# Patient Record
Sex: FEMALE | Race: WHITE | NOT HISPANIC OR LATINO | Employment: UNEMPLOYED | URBAN - METROPOLITAN AREA
[De-identification: names, ages, dates, MRNs, and addresses within clinical notes are randomized per-mention and may not be internally consistent; named-entity substitution may affect disease eponyms.]

---

## 2017-02-24 ENCOUNTER — HOSPITAL ENCOUNTER (OUTPATIENT)
Facility: MEDICAL CENTER | Age: 22
End: 2017-02-24
Attending: EMERGENCY MEDICINE
Payer: MEDICAID

## 2017-02-24 ENCOUNTER — OFFICE VISIT (OUTPATIENT)
Dept: URGENT CARE | Facility: CLINIC | Age: 22
End: 2017-02-24

## 2017-02-24 VITALS
HEART RATE: 90 BPM | OXYGEN SATURATION: 98 % | WEIGHT: 190 LBS | DIASTOLIC BLOOD PRESSURE: 82 MMHG | TEMPERATURE: 98.2 F | SYSTOLIC BLOOD PRESSURE: 120 MMHG | RESPIRATION RATE: 16 BRPM

## 2017-02-24 DIAGNOSIS — J02.9 PHARYNGITIS, UNSPECIFIED ETIOLOGY: ICD-10-CM

## 2017-02-24 LAB
INT CON NEG: NORMAL
INT CON POS: NORMAL
S PYO AG THROAT QL: NEGATIVE

## 2017-02-24 PROCEDURE — 87070 CULTURE OTHR SPECIMN AEROBIC: CPT

## 2017-02-24 PROCEDURE — 99000 SPECIMEN HANDLING OFFICE-LAB: CPT | Performed by: EMERGENCY MEDICINE

## 2017-02-24 PROCEDURE — 99203 OFFICE O/P NEW LOW 30 MIN: CPT | Performed by: EMERGENCY MEDICINE

## 2017-02-24 PROCEDURE — 87077 CULTURE AEROBIC IDENTIFY: CPT

## 2017-02-24 PROCEDURE — 87880 STREP A ASSAY W/OPTIC: CPT | Performed by: EMERGENCY MEDICINE

## 2017-02-24 RX ORDER — CITALOPRAM HYDROBROMIDE 10 MG/1
10 TABLET ORAL DAILY
COMMUNITY
End: 2018-01-07

## 2017-02-24 ASSESSMENT — ENCOUNTER SYMPTOMS
COUGH: 0
SENSORY CHANGE: 0
SWOLLEN GLANDS: 1
ABDOMINAL PAIN: 0
HEADACHES: 1
HOARSE VOICE: 0
HEMOPTYSIS: 0
TROUBLE SWALLOWING: 1
SHORTNESS OF BREATH: 0
NECK PAIN: 0
STRIDOR: 0
SPEECH CHANGE: 0
EYE REDNESS: 0
DIARRHEA: 0
BACK PAIN: 0
VOMITING: 0
SORE THROAT: 1
CHILLS: 0
NERVOUS/ANXIOUS: 0
FEVER: 1
PALPITATIONS: 0
EYE DISCHARGE: 0

## 2017-02-24 NOTE — MR AVS SNAPSHOT
Rosalba Leavitt   2017 6:15 PM   Office Visit   MRN: [id number]    Department:  [facility]   Dept Phone:  [phone]    Description:  Female : 1995   Provider:  MICHELLE Licona M.D.           Reason for Visit     Pharyngitis sore throat, headache, body aches chills x 4 days      Allergies as of 2017     No Known Allergies      You were diagnosed with     Pharyngitis, unspecified etiology   [[id number]]         Vital Signs     Blood Pressure Pulse Temperature Respirations Weight Oxygen Saturation    120/82 mmHg 90 36.8 °C (98.2 °F) 16 86.183 kg (190 lb) 98%    Last Menstrual Period Breastfeeding? 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   2011 - Present      Health Maintenance        Date Due Completion Dates    IMM HEP B VACCINE (1 of 3 - Primary Series) 1996 ---    IMM HEP A VACCINE (1 of 2 - Standard Series) 1997 ---    IMM HPV VACCINE (1 of 3 - Female 3 Dose Series) 2007 ---    IMM VARICELLA (CHICKENPOX) VACCINE (2 of 2 - 2 Dose Adolescent Series) 2011    IMM MENINGOCOCCAL VACCINE (MCV4) (2 of 2) 2012    IMM DTaP/Tdap/Td Vaccine (1 - Tdap) 2015 ---    IMM INFLUENZA (1) 2016 ---            Results     POCT Rapid Strep A      Component    Rapid Strep Screen    NEGATIVE    Internal Control Positive    Valid    Internal Control Negative    Valid                        Current Immunizations     IPV 2011    Meningococcal Conjugate Vaccine MCV4 (Menactra) 2011    Tuberculin Skin Test 2014  3:40 PM, 2014  2:25 PM    Varicella Vaccine Live 2011      Below and/or attached are the medications your provider expects you to take. Review all of  your home medications and newly ordered medications with your provider and/or pharmacist. Follow medication instructions as directed by your provider and/or pharmacist. Please keep your medication list with you and share with your provider. Update the information when medications are discontinued, doses are changed, or new medications (including over-the-counter products) are added; and carry medication information at all times in the event of emergency situations     Allergies:  No Known Allergies          Medications  Valid as of: February 24, 2017 -  6:53 PM    Generic Name Brand Name Tablet Size Instructions for use    Albuterol Sulfate (Aero Soln) VENTOLIN  (90 BASE) MCG/ACT Inhale 2 Puffs by mouth every 6 hours as needed for Shortness of Breath. Please include metered dose inhaler chamber/spacer device and illustrate useage        Citalopram Hydrobromide (Tab) CELEXA 10 MG Take 10 mg by mouth every day.        Hydrocod Polst-Chlorphen Polst (Liquid CR) TUSSIONEX 10-8 MG/5ML Take 5 mL by mouth every 12 hours as needed for Cough or Rhinitis. No driving or operation of machinery after taking this medication. Do not exceed 10 mL in a 24-hour period.        Lidocaine HCl (Solution) XYLOCAINE 2 % Take 5 mL by mouth 4 times a day.        .                 Medicines prescribed today were sent to:     [facility] [address] [place] [address], [place] - [address]    [address] [place] [address]    Phone: [phone] Fax: [phone]    Open 24 Hours?: No      Medication refill instructions:       If your prescription bottle indicates you have medication refills left, it is not necessary to call your provider’s office. Please contact your pharmacy and they will refill your medication.    If your prescription bottle indicates you do not have any refills left, you may request refills at any time through one of the following ways: The online [employer] system (except Urgent Care), by calling your provider’s  office, or by asking your pharmacy to contact your provider’s office with a refill request. Medication refills are processed only during regular business hours and may not be available until the next business day. Your provider may request additional information or to have a follow-up visit with you prior to refilling your medication.   *Please Note: Medication refills are assigned a new Rx number when refilled electronically. Your pharmacy may indicate that no refills were authorized even though a new prescription for the same medication is available at the pharmacy. Please request the medicine by name with the pharmacy before contacting your provider for a refill.        Your To Do List     Future Labs/Procedures Complete By Expires    CULTURE THROAT  As directed 2/24/2018         Potential Access Code: [email or web address]  Expires: 3/26/2017  6:53 PM    Your email address is not on file at [email or web address].  Email Addresses are required for you to sign up for Potential, please contact [phone] to verify your personal information and to provide your email address prior to attempting to register for Potential.    Rosalba Leavitt  [address] [place] [address]    Potential  A secure, online tool to manage your health information     [email or web address]’s Potential® is a secure, online tool that connects you to your personalized health information from the privacy of your home -- day or night - making it very easy for you to manage your healthcare. Once the activation process is completed, you can even access your medical information using the Potential mag, which is available for free in the Apple Mag store or Google Play store.     To learn more about Potential, visit [email or web address]    There are two levels of access available (as shown below):   My Chart Features  [facility] Doctor [facility]  Specialists [facility]  Urgent  Care Non-[facility] Primary Care Doctor   Email your healthcare team securely and privately  24/7 X X X    Manage appointments: schedule your next appointment; view details of past/upcoming appointments X      Request prescription refills. X      View recent personal medical records, including lab and immunizations X X X X   View health record, including health history, allergies, medications X X X X   Read reports about your outpatient visits, procedures, consult and ER notes X X X X   See your discharge summary, which is a recap of your hospital and/or ER visit that includes your diagnosis, lab results, and care plan X X  X     How to register for [employer]:  Once your e-mail address has been verified, follow the following steps to sign up for [employer].     1. Go to  [email or web address]  2. Click on the Sign Up Now box, which takes you to the New Member Sign Up page. You will need to provide the following information:  a. Enter your [employer] Access Code exactly as it appears at the top of this page. (You will not need to use this code after you’ve completed the sign-up process. If you do not sign up before the expiration date, you must request a new code.)   b. Enter your date of birth.   c. Enter your home email address.   d. Click Submit, and follow the next screen’s instructions.  3. Create a Sequella ID. This will be your Sequella login ID and cannot be changed, so think of one that is secure and easy to remember.  4. Create a Sequella password. You can change your password at any time.  5. Enter your Password Reset Question and Answer. This can be used at a later time if you forget your password.   6. Enter your e-mail address. This allows you to receive e-mail notifications when new information is available in Sequella.  7. Click Sign Up. You can now view your health information.    For assistance activating your Sequella account, call [phone]

## 2017-02-25 NOTE — PROGRESS NOTES
Subjective:      Rosalba Leavitt is a 21 y.o. female who presents with Pharyngitis            Pharyngitis   This is a new problem. Episode onset: for the past 4 days. The problem has been gradually worsening. The pain is worse on the right side. The maximum temperature recorded prior to her arrival was 100.4 - 100.9 F. The pain is at a severity of 7/10. Associated symptoms include headaches, swollen glands and trouble swallowing. Pertinent negatives include no abdominal pain, congestion, coughing, diarrhea, drooling, ear discharge, ear pain, hoarse voice, plugged ear sensation, neck pain, shortness of breath, stridor or vomiting.     No Known Allergies   Social History     Social History   • Marital Status: Single     Spouse Name: N/A   • Number of Children: N/A   • Years of Education: N/A     Occupational History   • Not on file.     Social History Main Topics   • Smoking status: Never Smoker    • Smokeless tobacco: Not on file   • Alcohol Use: No   • Drug Use: No   • Sexual Activity: No     Other Topics Concern   • Not on file     Social History Narrative   History reviewed. No pertinent past medical history.   Current Outpatient Prescriptions on File Prior to Visit   Medication Sig Dispense Refill   • hydrocod polst-chlorphen polst (TUSSIONEX PENNKINETIC ER) 10-8 MG/5ML LQCR Take 5 mL by mouth every 12 hours as needed for Cough or Rhinitis. No driving or operation of machinery after taking this medication. Do not exceed 10 mL in a 24-hour period. 75 mL 0   • VENTOLIN  (90 BASE) MCG/ACT AERS Inhale 2 Puffs by mouth every 6 hours as needed for Shortness of Breath. Please include metered dose inhaler chamber/spacer device and illustrate useage 1 Inhaler 0     No current facility-administered medications on file prior to visit.     Family History   Problem Relation Age of Onset   • Adopted: Yes     Review of Systems   Constitutional: Positive for fever. Negative for chills.   HENT: Positive for sore throat  and trouble swallowing. Negative for congestion, drooling, ear discharge, ear pain, hoarse voice and tinnitus.    Eyes: Negative for discharge and redness.   Respiratory: Negative for cough, hemoptysis, shortness of breath and stridor.    Cardiovascular: Negative for chest pain and palpitations.   Gastrointestinal: Negative for vomiting, abdominal pain and diarrhea.   Genitourinary: Negative.    Musculoskeletal: Negative for back pain and neck pain.   Skin: Negative for rash.   Neurological: Positive for headaches. Negative for sensory change and speech change.   Psychiatric/Behavioral: The patient is not nervous/anxious.           Objective:     /82 mmHg  Pulse 90  Temp(Src) 36.8 °C (98.2 °F)  Resp 16  Wt 86.183 kg (190 lb)  SpO2 98%  LMP 01/28/2017  Breastfeeding? No     Physical Exam   Constitutional: She appears well-developed and well-nourished. She appears distressed.   HENT:   Head: Normocephalic and atraumatic.   Right Ear: External ear normal.   Left Ear: External ear normal.   Mouth/Throat: No oropharyngeal exudate.   Pharynx with purulent tonsils,   Eyes: Conjunctivae are normal. Right eye exhibits no discharge. Left eye exhibits no discharge. No scleral icterus.   Neck: No tracheal deviation present. No thyromegaly present.   Cardiovascular: Normal rate and regular rhythm.    No murmur heard.  Pulmonary/Chest: Effort normal and breath sounds normal. No stridor. No respiratory distress. She has no wheezes.   Abdominal: She exhibits no distension. There is no tenderness.   Musculoskeletal: Normal range of motion. She exhibits no edema or tenderness.   Neurological: She is alert. Coordination normal.   Skin: She is not diaphoretic. No erythema.   Psychiatric: She has a normal mood and affect. Her behavior is normal.   Vitals reviewed.            Rapid strep elinor  Throst culture pending.  Assessment/Plan:     DX Acute Purulent Tonsillitis    I am recommending the patient initiate/ continue  hydration efforts including the use of a vaporizer/humidifier/ netti pot. I also recommend the pt, initiate Mucinex (if older than 4) Sudafed or Dayquil if not hypertensive. In addition the patient will initiate the prescribed prescription medication/s lidocaine viscous,: I will call if positive If the patient's condition exacerbates with worsening dysphagia, shortness of breath, uncontrolled fever, headache or chest pressure he/she will return immediately to the urgent care or go to  the emergency department for further evaluation.  Pt is going into the weekend and doesn't need a work note..  MICHELLE Licona  .

## 2017-02-27 LAB
BACTERIA SPEC RESP CULT: ABNORMAL
BACTERIA SPEC RESP CULT: ABNORMAL
SIGNIFICANT IND 70042: ABNORMAL
SOURCE SOURCE: ABNORMAL

## 2017-02-28 ENCOUNTER — TELEPHONE (OUTPATIENT)
Dept: URGENT CARE | Facility: CLINIC | Age: 22
End: 2017-02-28

## 2017-03-15 ENCOUNTER — HOSPITAL ENCOUNTER (EMERGENCY)
Facility: MEDICAL CENTER | Age: 22
End: 2017-03-15
Attending: EMERGENCY MEDICINE
Payer: MEDICAID

## 2017-03-15 ENCOUNTER — APPOINTMENT (OUTPATIENT)
Dept: RADIOLOGY | Facility: MEDICAL CENTER | Age: 22
End: 2017-03-15
Attending: EMERGENCY MEDICINE
Payer: MEDICAID

## 2017-03-15 VITALS
OXYGEN SATURATION: 93 % | SYSTOLIC BLOOD PRESSURE: 138 MMHG | HEART RATE: 87 BPM | RESPIRATION RATE: 16 BRPM | HEIGHT: 64 IN | DIASTOLIC BLOOD PRESSURE: 86 MMHG | BODY MASS INDEX: 36.4 KG/M2 | WEIGHT: 213.19 LBS | TEMPERATURE: 98.8 F

## 2017-03-15 DIAGNOSIS — R10.13 EPIGASTRIC PAIN: ICD-10-CM

## 2017-03-15 DIAGNOSIS — N30.00 ACUTE CYSTITIS WITHOUT HEMATURIA: ICD-10-CM

## 2017-03-15 LAB
ALBUMIN SERPL BCP-MCNC: 4.1 G/DL (ref 3.2–4.9)
ALBUMIN/GLOB SERPL: 1.3 G/DL
ALP SERPL-CCNC: 90 U/L (ref 30–99)
ALT SERPL-CCNC: 19 U/L (ref 2–50)
ANION GAP SERPL CALC-SCNC: 9 MMOL/L (ref 0–11.9)
APPEARANCE UR: CLEAR
AST SERPL-CCNC: 21 U/L (ref 12–45)
BACTERIA #/AREA URNS HPF: ABNORMAL /HPF
BASOPHILS # BLD AUTO: 0.5 % (ref 0–1.8)
BASOPHILS # BLD: 0.05 K/UL (ref 0–0.12)
BILIRUB SERPL-MCNC: 0.5 MG/DL (ref 0.1–1.5)
BILIRUB UR QL STRIP.AUTO: NEGATIVE
BUN SERPL-MCNC: <5 MG/DL (ref 8–22)
CALCIUM SERPL-MCNC: 9.3 MG/DL (ref 8.4–10.2)
CHLORIDE SERPL-SCNC: 102 MMOL/L (ref 96–112)
CO2 SERPL-SCNC: 25 MMOL/L (ref 20–33)
COLOR UR: YELLOW
CREAT SERPL-MCNC: 0.58 MG/DL (ref 0.5–1.4)
CULTURE IF INDICATED INDCX: YES UA CULTURE
EOSINOPHIL # BLD AUTO: 0.11 K/UL (ref 0–0.51)
EOSINOPHIL NFR BLD: 1 % (ref 0–6.9)
EPI CELLS #/AREA URNS HPF: ABNORMAL /HPF
ERYTHROCYTE [DISTWIDTH] IN BLOOD BY AUTOMATED COUNT: 37 FL (ref 35.9–50)
GFR SERPL CREATININE-BSD FRML MDRD: >60 ML/MIN/1.73 M 2
GLOBULIN SER CALC-MCNC: 3.1 G/DL (ref 1.9–3.5)
GLUCOSE SERPL-MCNC: 85 MG/DL (ref 65–99)
GLUCOSE UR STRIP.AUTO-MCNC: NEGATIVE MG/DL
HCG SERPL QL: NEGATIVE
HCT VFR BLD AUTO: 43.8 % (ref 37–47)
HGB BLD-MCNC: 15.6 G/DL (ref 12–16)
IMM GRANULOCYTES # BLD AUTO: 0.03 K/UL (ref 0–0.11)
IMM GRANULOCYTES NFR BLD AUTO: 0.3 % (ref 0–0.9)
KETONES UR STRIP.AUTO-MCNC: 15 MG/DL
LEUKOCYTE ESTERASE UR QL STRIP.AUTO: ABNORMAL
LIPASE SERPL-CCNC: 15 U/L (ref 7–58)
LYMPHOCYTES # BLD AUTO: 3.52 K/UL (ref 1–4.8)
LYMPHOCYTES NFR BLD: 31.8 % (ref 22–41)
MCH RBC QN AUTO: 30.6 PG (ref 27–33)
MCHC RBC AUTO-ENTMCNC: 35.6 G/DL (ref 33.6–35)
MCV RBC AUTO: 86.1 FL (ref 81.4–97.8)
MICRO URNS: ABNORMAL
MONOCYTES # BLD AUTO: 0.66 K/UL (ref 0–0.85)
MONOCYTES NFR BLD AUTO: 6 % (ref 0–13.4)
NEUTROPHILS # BLD AUTO: 6.69 K/UL (ref 2–7.15)
NEUTROPHILS NFR BLD: 60.4 % (ref 44–72)
NITRITE UR QL STRIP.AUTO: NEGATIVE
NRBC # BLD AUTO: 0 K/UL
NRBC BLD AUTO-RTO: 0 /100 WBC
PH UR STRIP.AUTO: 7 [PH]
PLATELET # BLD AUTO: 322 K/UL (ref 164–446)
PMV BLD AUTO: 11.3 FL (ref 9–12.9)
POTASSIUM SERPL-SCNC: 3.8 MMOL/L (ref 3.6–5.5)
PROT SERPL-MCNC: 7.2 G/DL (ref 6–8.2)
PROT UR QL STRIP: NEGATIVE MG/DL
RBC # BLD AUTO: 5.09 M/UL (ref 4.2–5.4)
RBC # URNS HPF: ABNORMAL /HPF
RBC UR QL AUTO: NEGATIVE
SODIUM SERPL-SCNC: 136 MMOL/L (ref 135–145)
SP GR UR STRIP.AUTO: <=1.005
WBC # BLD AUTO: 11.1 K/UL (ref 4.8–10.8)
WBC #/AREA URNS HPF: ABNORMAL /HPF

## 2017-03-15 PROCEDURE — 700111 HCHG RX REV CODE 636 W/ 250 OVERRIDE (IP): Performed by: EMERGENCY MEDICINE

## 2017-03-15 PROCEDURE — 80053 COMPREHEN METABOLIC PANEL: CPT

## 2017-03-15 PROCEDURE — 76705 ECHO EXAM OF ABDOMEN: CPT

## 2017-03-15 PROCEDURE — 83690 ASSAY OF LIPASE: CPT

## 2017-03-15 PROCEDURE — 99284 EMERGENCY DEPT VISIT MOD MDM: CPT

## 2017-03-15 PROCEDURE — 700105 HCHG RX REV CODE 258: Performed by: EMERGENCY MEDICINE

## 2017-03-15 PROCEDURE — 87086 URINE CULTURE/COLONY COUNT: CPT

## 2017-03-15 PROCEDURE — 36415 COLL VENOUS BLD VENIPUNCTURE: CPT

## 2017-03-15 PROCEDURE — 81001 URINALYSIS AUTO W/SCOPE: CPT

## 2017-03-15 PROCEDURE — 85025 COMPLETE CBC W/AUTO DIFF WBC: CPT

## 2017-03-15 PROCEDURE — 96375 TX/PRO/DX INJ NEW DRUG ADDON: CPT

## 2017-03-15 PROCEDURE — 94760 N-INVAS EAR/PLS OXIMETRY 1: CPT

## 2017-03-15 PROCEDURE — 96374 THER/PROPH/DIAG INJ IV PUSH: CPT

## 2017-03-15 PROCEDURE — 84703 CHORIONIC GONADOTROPIN ASSAY: CPT

## 2017-03-15 RX ORDER — NITROFURANTOIN 25; 75 MG/1; MG/1
100 CAPSULE ORAL 2 TIMES DAILY
Qty: 14 CAP | Refills: 0 | Status: SHIPPED | OUTPATIENT
Start: 2017-03-15 | End: 2017-03-22

## 2017-03-15 RX ORDER — HYDROCODONE BITARTRATE AND ACETAMINOPHEN 5; 325 MG/1; MG/1
1-2 TABLET ORAL EVERY 6 HOURS PRN
Qty: 15 TAB | Refills: 0 | Status: SHIPPED | OUTPATIENT
Start: 2017-03-15 | End: 2018-01-07

## 2017-03-15 RX ORDER — SODIUM CHLORIDE 9 MG/ML
1000 INJECTION, SOLUTION INTRAVENOUS ONCE
Status: COMPLETED | OUTPATIENT
Start: 2017-03-15 | End: 2017-03-15

## 2017-03-15 RX ORDER — MECOBALAMIN 5000 MCG
15 TABLET,DISINTEGRATING ORAL DAILY
Qty: 30 CAP | Refills: 0 | Status: SHIPPED | OUTPATIENT
Start: 2017-03-15 | End: 2018-01-07

## 2017-03-15 RX ORDER — MORPHINE SULFATE 4 MG/ML
4 INJECTION, SOLUTION INTRAMUSCULAR; INTRAVENOUS ONCE
Status: COMPLETED | OUTPATIENT
Start: 2017-03-15 | End: 2017-03-15

## 2017-03-15 RX ORDER — ONDANSETRON 4 MG/1
4 TABLET, ORALLY DISINTEGRATING ORAL EVERY 8 HOURS PRN
Qty: 10 TAB | Refills: 0 | Status: SHIPPED | OUTPATIENT
Start: 2017-03-15 | End: 2018-01-07

## 2017-03-15 RX ORDER — ONDANSETRON 2 MG/ML
4 INJECTION INTRAMUSCULAR; INTRAVENOUS ONCE
Status: COMPLETED | OUTPATIENT
Start: 2017-03-15 | End: 2017-03-15

## 2017-03-15 RX ADMIN — MORPHINE SULFATE 4 MG: 4 INJECTION INTRAVENOUS at 19:22

## 2017-03-15 RX ADMIN — ONDANSETRON 4 MG: 2 INJECTION, SOLUTION INTRAMUSCULAR; INTRAVENOUS at 19:08

## 2017-03-15 RX ADMIN — SODIUM CHLORIDE 1000 ML: 9 INJECTION, SOLUTION INTRAVENOUS at 19:07

## 2017-03-15 ASSESSMENT — PAIN SCALES - GENERAL: PAINLEVEL_OUTOF10: 0

## 2017-03-15 NOTE — ED AVS SNAPSHOT
[employer] Access Code: [email or web address]  Expires: 3/26/2017  7:53 PM    Your email address is not on file at [email or web address].  Email Addresses are required for you to sign up for [employer], please contact [phone] to verify your personal information and to provide your email address prior to attempting to register for [employer].    Rosalba Oneil Dagmar  [address] [place] [address]    [employer]  A secure, online tool to manage your health information     [facility]’s [employer]® is a secure, online tool that connects you to your personalized health information from the privacy of your home -- day or night - making it very easy for you to manage your healthcare. Once the activation process is completed, you can even access your medical information using the [employer] mag, which is available for free in the Apple Mag store or Google Play store.     To learn more about [employer], visit [email or web address]    There are two levels of access available (as shown below):   My Chart Features  [facility] Primary Care Doctor [facility]  Specialists [facility]  Urgent  Care Non-[facility] Primary Care Doctor   Email your healthcare team securely and privately 24/7 X X X    Manage appointments: schedule your next appointment; view details of past/upcoming appointments X      Request prescription refills. X      View recent personal medical records, including lab and immunizations X X X X   View health record, including health history, allergies, medications X X X X   Read reports about your outpatient visits, procedures, consult and ER notes X X X X   See your discharge summary, which is a recap of your hospital and/or ER visit that includes your diagnosis, lab results, and care plan X X  X     How to register for [employer]:  Once your e-mail address has been verified, follow the following steps to sign up for [employer].     1. Go to  [email or web address]  2. Click on the Sign Up Now box, which takes you to the New Member Sign Up page.  You will need to provide the following information:  a. Enter your [employer] Access Code exactly as it appears at the top of this page. (You will not need to use this code after you’ve completed the sign-up process. If you do not sign up before the expiration date, you must request a new code.)   b. Enter your date of birth.   c. Enter your home email address.   d. Click Submit, and follow the next screen’s instructions.  3. Create a [employer] ID. This will be your [employer] login ID and cannot be changed, so think of one that is secure and easy to remember.  4. Create a [employer] password. You can change your password at any time.  5. Enter your Password Reset Question and Answer. This can be used at a later time if you forget your password.   6. Enter your e-mail address. This allows you to receive e-mail notifications when new information is available in [employer].  7. Click Sign Up. You can now view your health information.    For assistance activating your [employer] account, call [phone]

## 2017-03-15 NOTE — ED AVS SNAPSHOT
3/15/2017          Rosalba Leavitt  [address] [place] [address]    Dear Rosalba:    [facility] wants to ensure your discharge home is safe and you or your loved ones have had all your questions answered regarding your care after you leave the hospital.    You may receive a telephone call within two days of your discharge.  This call is to make certain you understand your discharge instructions as well as ensure we provided you with the best care possible during your stay with us.     The call will only last approximately 3-5 minutes and will be done by a nurse.    Once again, we want to ensure your discharge home is safe and that you have a clear understanding of any next steps in your care.  If you have any questions or concerns, please do not hesitate to contact us, we are here for you.  Thank you for choosing [facility] for your healthcare needs.    Sincerely,    Roverto Dejesus    [facility]

## 2017-03-15 NOTE — ED NOTES
Saw PMD was worked up, originally she had abd pain now the pain is in her back with painful urination.

## 2017-03-15 NOTE — ED AVS SNAPSHOT
Home Care Instructions                                                                                                                Rosalba Leavitt   MRN: [id number]    Department:  [facility], Emergency Dept   Date of Visit:  3/15/2017            [facility], Emergency Dept    [address]    [place] [address]    Phone:  [phone]      You were seen by     Luis Valdes M.D.      Your Diagnosis Was     Epigastric pain     R10.13       These are the medications you received during your hospitalization from 03/15/2017 1521 to 03/15/2017 2119     Date/Time Order Dose Route Action    03/15/2017 1907 NS infusion 1,000 mL 1,000 mL Intravenous New Bag    03/15/2017 1922 morphine (pf) 4 mg/ml injection 4 mg 4 mg Intravenous Given    03/15/2017 1908 ondansetron (ZOFRAN) syringe/vial injection 4 mg 4 mg Intravenous Given      Follow-up Information     1. Follow up with [facility]. Call in 1 day.    Specialty:  Physician Assistant    Contact information    [address] [place] [address] [phone]        Medication Information     Review all of your home medications and newly ordered medications with your primary doctor and/or pharmacist as soon as possible. Follow medication instructions as directed by your doctor and/or pharmacist.     Please keep your complete medication list with you and share with your physician. Update the information when medications are discontinued, doses are changed, or new medications (including over-the-counter products) are added; and carry medication information at all times in the event of emergency situations.               Medication List      START taking these medications        Instructions    Morning Afternoon Evening Bedtime    hydrocodone-acetaminophen 5-325 MG Tabs per tablet   Commonly known as:  NORCO        Take 1-2 Tabs by mouth every 6 hours as needed.   Dose:  1-2 Tab                        lansoprazole 15 MG Cpdr   Commonly known as:  PREVACID 24HR        Take 1 Cap by mouth every day.   Dose:  15 mg                        ondansetron 4 MG Tbdp   Commonly known as:  ZOFRAN ODT        Take 1 Tab by mouth every 8 hours as needed for Nausea/Vomiting.   Dose:  4 mg                          ASK your doctor about these medications        Instructions    Morning Afternoon Evening Bedtime    CELEXA 10 MG tablet   Generic drug:  citalopram        Take 10 mg by mouth every day.   Dose:  10 mg                        hydrocod polst-chlorphen polst 10-8 MG/5ML Lqcr   Commonly known as:  TUSSIONEX PENNKINETIC ER        Take 5 mL by mouth every 12 hours as needed for Cough or Rhinitis. No driving or operation of machinery after taking this medication. Do not exceed 10 mL in a 24-hour period.   Dose:  5 mL                        lidocaine viscous 2% 2 % Soln   Commonly known as:  XYLOCAINE        Doctor's comments:  5 cc in 1/4 cup of H2O swish and spit.   Take 5 mL by mouth 4 times a day.   Dose:  5 mL                        VENTOLIN  (90 BASE) MCG/ACT Aers inhalation aerosol   Generic drug:  albuterol        Inhale 2 Puffs by mouth every 6 hours as needed for Shortness of Breath. Please include metered dose inhaler chamber/spacer device and illustrate useage   Dose:  2 Puff                             Where to Get Your Medications      You can get these medications from any pharmacy     Bring a paper prescription for each of these medications    - hydrocodone-acetaminophen 5-325 MG Tabs per tablet  - lansoprazole 15 MG Cpdr  - ondansetron 4 MG Tbdp            Procedures and tests performed during your visit     CBC WITH DIFFERENTIAL    COMP METABOLIC PANEL    ESTIMATED GFR    HCG QUAL SERUM    LIPASE    URINALYSIS,CULTURE IF INDICATED    URINE CULTURE(NEW)    URINE MICROSCOPIC (W/UA)    US-GALLBLADDER        Discharge Instructions       Gastritis, Adult  Gastritis is soreness and swelling (inflammation) of  the lining of the stomach. Gastritis can develop as a sudden onset (acute) or long-term (chronic) condition. If gastritis is not treated, it can lead to stomach bleeding and ulcers.  CAUSES   Gastritis occurs when the stomach lining is weak or damaged. Digestive juices from the stomach then inflame the weakened stomach lining. The stomach lining may be weak or damaged due to viral or bacterial infections. One common bacterial infection is the Helicobacter pylori infection. Gastritis can also result from excessive alcohol consumption, taking certain medicines, or having too much acid in the stomach.   SYMPTOMS   In some cases, there are no symptoms. When symptoms are present, they may include:  · Pain or a burning sensation in the upper abdomen.  · Nausea.  · Vomiting.  · An uncomfortable feeling of fullness after eating.  DIAGNOSIS   Your caregiver may suspect you have gastritis based on your symptoms and a physical exam. To determine the cause of your gastritis, your caregiver may perform the following:  · Blood or stool tests to check for the H pylori bacterium.  · Gastroscopy. A thin, flexible tube (endoscope) is passed down the esophagus and into the stomach. The endoscope has a light and camera on the end. Your caregiver uses the endoscope to view the inside of the stomach.  · Taking a tissue sample (biopsy) from the stomach to examine under a microscope.  TREATMENT   Depending on the cause of your gastritis, medicines may be prescribed. If you have a bacterial infection, such as an H pylori infection, antibiotics may be given. If your gastritis is caused by too much acid in the stomach, H2 blockers or antacids may be given. Your caregiver may recommend that you stop taking aspirin, ibuprofen, or other nonsteroidal anti-inflammatory drugs (NSAIDs).  HOME CARE INSTRUCTIONS  · Only take over-the-counter or prescription medicines as directed by your caregiver.  · If you were given antibiotic medicines, take them  as directed. Finish them even if you start to feel better.  · Drink enough fluids to keep your urine clear or pale yellow.  · Avoid foods and drinks that make your symptoms worse, such as:  1. Caffeine or alcoholic drinks.  2. Chocolate.  3. Peppermint or mint flavorings.  4. Garlic and onions.  5. Spicy foods.  6. Citrus fruits, such as oranges, fran, or limes.  7. Tomato-based foods such as sauce, chili, salsa, and pizza.  8. Fried and fatty foods.  · Eat small, frequent meals instead of large meals.  SEEK IMMEDIATE MEDICAL CARE IF:   · You have black or dark red stools.  · You vomit blood or material that looks like coffee grounds.  · You are unable to keep fluids down.  · Your abdominal pain gets worse.  · You have a fever.  · You do not feel better after 1 week.  · You have any other questions or concerns.  MAKE SURE YOU:  · Understand these instructions.  · Will watch your condition.  · Will get help right away if you are not doing well or get worse.     This information is not intended to replace advice given to you by your health care provider. Make sure you discuss any questions you have with your health care provider.     Document Released: 12/12/2002 Document Revised: 06/18/2013 Document Reviewed: 01/30/2013  [employer] Patient Education ©2016 [employer]        Abdominal Pain (Nonspecific)  Your exam might not show the exact reason you have abdominal pain. Since there are many different causes of abdominal pain, another checkup and more tests may be needed. It is very important to follow up for lasting (persistent) or worsening symptoms. A possible cause of abdominal pain in any person who still has his or her appendix is acute appendicitis. Appendicitis is often hard to diagnose. Normal blood tests, urine tests, ultrasound, and CT scans do not completely rule out early appendicitis or other causes of abdominal pain. Sometimes, only the changes that happen over time will allow appendicitis  and other causes of abdominal pain to be determined. Other potential problems that may require surgery may also take time to become more apparent. Because of this, it is important that you follow all of the instructions below.  HOME CARE INSTRUCTIONS   · Rest as much as possible.   · Do not eat solid food until your pain is gone.   · While adults or children have pain: A diet of water, weak decaffeinated tea, broth or bouillon, gelatin, oral rehydration solutions (ORS), frozen ice pops, or ice chips may be helpful.   · When pain is gone in adults or children: Start a light diet (dry toast, crackers, applesauce, or white rice). Increase the diet slowly as long as it does not bother you. Eat no dairy products (including cheese and eggs) and no spicy, fatty, fried, or high-fiber foods.   · Use no alcohol, caffeine, or cigarettes.   · Take your regular medicines unless your caregiver told you not to.   · Take any prescribed medicine as directed.   · Only take over-the-counter or prescription medicines for pain, discomfort, or fever as directed by your caregiver. Do not give aspirin to children.   If your caregiver has given you a follow-up appointment, it is very important to keep that appointment. Not keeping the appointment could result in a permanent injury and/or lasting (chronic) pain and/or disability. If there is any problem keeping the appointment, you must call to reschedule.   SEEK IMMEDIATE MEDICAL CARE IF:   · Your pain is not gone in 24 hours.   · Your pain becomes worse, changes location, or feels different.   · You or your child has an oral temperature above 102° F (38.9° C), not controlled by medicine.   · Your baby is older than 3 months with a rectal temperature of 102° F (38.9° C) or higher.   · Your baby is 3 months old or younger with a rectal temperature of 100.4° F (38° C) or higher.   · You have shaking chills.   · You keep throwing up (vomiting) or cannot drink liquids.   · There is blood in  your vomit or you see blood in your bowel movements.   · Your bowel movements become dark or black.   · You have frequent bowel movements.   · Your bowel movements stop (become blocked) or you cannot pass gas.   · You have bloody, frequent, or painful urination.   · You have yellow discoloration in the skin or whites of the eyes.   · Your stomach becomes bloated or bigger.   · You have dizziness or fainting.   · You have chest or back pain.   MAKE SURE YOU:   · Understand these instructions.   · Will watch your condition.   · Will get help right away if you are not doing well or get worse.   Document Released: 12/18/2006 Document Revised: 03/11/2013 Document Reviewed: 11/15/2010  [employer]® Patient Information ©2013 [employer].            Patient Information     Patient Information    Following emergency treatment: all patient requiring follow-up care must return either to a private physician or a clinic if your condition worsens before you are able to obtain further medical attention, please return to the emergency room.     Billing Information    At [facility], we work to make the billing process streamlined for our patients.  Our Representatives are here to answer any questions you may have regarding your hospital bill.  If you have insurance coverage and have supplied your insurance information to us, we will submit a claim to your insurer on your behalf.  Should you have any questions regarding your bill, we can be reached online or by phone as follows:  Online: You are able pay your bills online or live chat with our representatives about any billing questions you may have. We are here to help Monday - Friday from 8:00am to 7:30pm and 9:00am - 12:00pm on Saturdays.  Please visit [email or web address]  for more information.   Phone:  [phone] or [phone]    Please note that your emergency physician, surgeon, pathologist, radiologist, anesthesiologist, and other  specialists are not employed by [facility] and will therefore bill separately for their services.  Please contact them directly for any questions concerning their bills at the numbers below:     Emergency Physician Services:  [phone]  [facility]:  [phone]  [facility]:  [phone]  [facility]:  [phone]    1. Your final bill may vary from the amount quoted upon discharge if all procedures are not complete at that time, or if your doctor has additional procedures of which we are not aware. You will receive an additional bill if you return to the Emergency Department at [facility] for suture removal regardless of the facility of which the sutures were placed.     2. Please arrange for settlement of this account at the emergency registration.    3. All self-pay accounts are due in full at the time of treatment.  If you are unable to meet this obligation then payment is expected within 4-5 days.     4. If you have had radiology studies (CT, X-ray, Ultrasound, MRI), you have received a preliminary result during your emergency department visit. Please contact the radiology department [phone] to receive a copy of your final result. Please discuss the Final result with your primary physician or with the follow up physician provided.     Crisis Hotline:  [place] Crisis Hotline:  0-697-PMCBLLI or 1-811.375.5292  [place] Crisis Hotline:    1-608.259.1192 or 482-908-5432         ED Discharge Follow Up Questions    1. In order to provide you with very good care, we would like to follow up with a phone call in the next few days.  May we have your permission to contact you?     YES /  NO    2. What is the best phone number to call you? (       )_____-__________    3. What is the best time to call you?      Morning  /  Afternoon  /  Evening                   Patient Signature:  ____________________________________________________________    Date:   ____________________________________________________________

## 2017-03-16 NOTE — ED PROVIDER NOTES
"ED Provider Note    CHIEF COMPLAINT  Chief Complaint   Patient presents with   • Abdominal Pain   • Low Back Pain       HPI  Rosalba Clarice Leavitt is a 21 y.o. female who presents with complaint of upper abdominal pain, nausea, not feeling well for the past 3 weeks.  The patient denies any vomiting, but has had intermittent episodes of watery diarrhea. She denies any blood in her stools. She says that when she eats she has some increased diarrhea. She has had no fever, chills, sore throat, cough or congestion, any difficulty breathing. She has been feeling slightly lightheaded and dizzy at times. The patient notes her last menstrual period was about 2 weeks ago on time and normal. The patient recently saw her primary care provider about a week ago and had some blood work and a breath test to check for H. pylori which was negative.  The patient says that she has also been having some pain with urination for the past 3 days.  She is also some lower back pain for the past 2 days.    REVIEW OF SYSTEMS  See HPI for further details. All other systems are negative.     PAST MEDICAL HISTORY  No past medical history on file.    FAMILY HISTORY  Family History   Problem Relation Age of Onset   • Adopted: Yes       SOCIAL HISTORY  Social History     Social History   • Marital Status: Single     Spouse Name: N/A   • Number of Children: N/A   • Years of Education: N/A     Social History Main Topics   • Smoking status: Never Smoker    • Smokeless tobacco: Not on file   • Alcohol Use: No   • Drug Use: No   • Sexual Activity: No     Other Topics Concern   • Not on file     Social History Narrative       SURGICAL HISTORY  No past surgical history on file.    CURRENT MEDICATIONS  Home Medications     **Home medications have not yet been reviewed for this encounter**          ALLERGIES  No Known Allergies    PHYSICAL EXAM  VITAL SIGNS: /86 mmHg  Pulse 87  Temp(Src) 37.1 °C (98.8 °F)  Resp 16  Ht 1.626 m (5' 4\")  Wt 96.7 kg " (213 lb 3 oz)  BMI 36.58 kg/m2  SpO2 93%  LMP 01/28/2017  Constitutional: Awake, alert, in no acute distress, Non-toxic appearance.   HENT: Atraumatic. Bilateral external ears normal, mucous membranes moist, throat nonerythematous without exudates, nose is normal.  Eyes: PERRL, EOMI, conjunctiva moist, noninjected.  Neck: Nontender, Normal range of motion, No nuchal rigidity, No stridor.   Lymphatic: No lymphadenopathy noted.   Cardiovascular: Regular rate and rhythm, no murmurs, rubs, gallops.  Thorax & Lungs:  Good breath sounds bilaterally, no wheezes, rales, or retractions.  No chest tenderness.  Abdomen: Bowel sounds normal, Soft, nondistended, there is tenderness in the epigastrium and upper periumbilical area, no tenderness in the right upper quadrant, no Jamison sign, no tenderness to lower abdomen, no rebound, guarding, masses.  Back: No CVA or spinal tenderness, there is mild tenderness to the lower paralumbar spinous areas bilaterally.  Extremities: Intact distal pulses, No edema, No tenderness.   Skin: Warm, Dry, No rashes.   Musculoskeletal: No joint swelling or tenderness.  Neurologic: Alert & oriented x 3, sensory and motor function normal. No focal deficits.   Psychiatric: Affect normal, Judgment normal, Mood normal.         RADIOLOGY/PROCEDURES  US-GALLBLADDER   Final Result      Unremarkable gallbladder ultrasound.               COURSE & MEDICAL DECISION MAKING  Pertinent Labs & Imaging studies reviewed. (See chart for details)  The patient presents with the above complaints. IV is placed, she was given a bolus of normal saline, morphine, and Zofran.  CBC shows a white count 11,100, normal differential, chemistry profile normal, lipase normal, hCG negative, urine shows 15 ketones, small leukocyte esterase, 2-5 WBC, and few bacteria. Urine was sent for culture. On recheck, the patient was feeling much improved. On reexamination her abdomen is very benign. Findings were discussed with the patient  and her mother. She will be placed on a course of Prevacid, Norco, and Zofran. She will also be started on Macrobid for a possible urinary tract infection. Patient is to follow-up with her PCP tomorrow, return to the ER for any fever, shaking chills, vomiting, worsening pain, or any other problems.    FINAL IMPRESSION  1. Acute gastritis  2. urinary tract infection  3.         Electronically signed by: Luis Valdes, 3/15/2017 9:30 PM

## 2017-03-16 NOTE — ED NOTES
Dc instructions and prescriptions given. Pt to f/u with pcp, return for worsening s/s. Aware of not being able to drive due to meds recvd in er

## 2017-03-16 NOTE — DISCHARGE INSTRUCTIONS
Gastritis, Adult  Gastritis is soreness and swelling (inflammation) of the lining of the stomach. Gastritis can develop as a sudden onset (acute) or long-term (chronic) condition. If gastritis is not treated, it can lead to stomach bleeding and ulcers.  CAUSES   Gastritis occurs when the stomach lining is weak or damaged. Digestive juices from the stomach then inflame the weakened stomach lining. The stomach lining may be weak or damaged due to viral or bacterial infections. One common bacterial infection is the Helicobacter pylori infection. Gastritis can also result from excessive alcohol consumption, taking certain medicines, or having too much acid in the stomach.   SYMPTOMS   In some cases, there are no symptoms. When symptoms are present, they may include:  · Pain or a burning sensation in the upper abdomen.  · Nausea.  · Vomiting.  · An uncomfortable feeling of fullness after eating.  DIAGNOSIS   Your caregiver may suspect you have gastritis based on your symptoms and a physical exam. To determine the cause of your gastritis, your caregiver may perform the following:  · Blood or stool tests to check for the H pylori bacterium.  · Gastroscopy. A thin, flexible tube (endoscope) is passed down the esophagus and into the stomach. The endoscope has a light and camera on the end. Your caregiver uses the endoscope to view the inside of the stomach.  · Taking a tissue sample (biopsy) from the stomach to examine under a microscope.  TREATMENT   Depending on the cause of your gastritis, medicines may be prescribed. If you have a bacterial infection, such as an H pylori infection, antibiotics may be given. If your gastritis is caused by too much acid in the stomach, H2 blockers or antacids may be given. Your caregiver may recommend that you stop taking aspirin, ibuprofen, or other nonsteroidal anti-inflammatory drugs (NSAIDs).  HOME CARE INSTRUCTIONS  · Only take over-the-counter or prescription medicines as directed by  your caregiver.  · If you were given antibiotic medicines, take them as directed. Finish them even if you start to feel better.  · Drink enough fluids to keep your urine clear or pale yellow.  · Avoid foods and drinks that make your symptoms worse, such as:  1. Caffeine or alcoholic drinks.  2. Chocolate.  3. Peppermint or mint flavorings.  4. Garlic and onions.  5. Spicy foods.  6. Citrus fruits, such as oranges, fran, or limes.  7. Tomato-based foods such as sauce, chili, salsa, and pizza.  8. Fried and fatty foods.  · Eat small, frequent meals instead of large meals.  SEEK IMMEDIATE MEDICAL CARE IF:   · You have black or dark red stools.  · You vomit blood or material that looks like coffee grounds.  · You are unable to keep fluids down.  · Your abdominal pain gets worse.  · You have a fever.  · You do not feel better after 1 week.  · You have any other questions or concerns.  MAKE SURE YOU:  · Understand these instructions.  · Will watch your condition.  · Will get help right away if you are not doing well or get worse.     This information is not intended to replace advice given to you by your health care provider. Make sure you discuss any questions you have with your health care provider.     Document Released: 12/12/2002 Document Revised: 06/18/2013 Document Reviewed: 01/30/2013  [employer] Interactive Patient Education ©2016 [employer]        Abdominal Pain (Nonspecific)  Your exam might not show the exact reason you have abdominal pain. Since there are many different causes of abdominal pain, another checkup and more tests may be needed. It is very important to follow up for lasting (persistent) or worsening symptoms. A possible cause of abdominal pain in any person who still has his or her appendix is acute appendicitis. Appendicitis is often hard to diagnose. Normal blood tests, urine tests, ultrasound, and CT scans do not completely rule out early appendicitis or other causes of abdominal pain.  Sometimes, only the changes that happen over time will allow appendicitis and other causes of abdominal pain to be determined. Other potential problems that may require surgery may also take time to become more apparent. Because of this, it is important that you follow all of the instructions below.  HOME CARE INSTRUCTIONS   · Rest as much as possible.   · Do not eat solid food until your pain is gone.   · While adults or children have pain: A diet of water, weak decaffeinated tea, broth or bouillon, gelatin, oral rehydration solutions (ORS), frozen ice pops, or ice chips may be helpful.   · When pain is gone in adults or children: Start a light diet (dry toast, crackers, applesauce, or white rice). Increase the diet slowly as long as it does not bother you. Eat no dairy products (including cheese and eggs) and no spicy, fatty, fried, or high-fiber foods.   · Use no alcohol, caffeine, or cigarettes.   · Take your regular medicines unless your caregiver told you not to.   · Take any prescribed medicine as directed.   · Only take over-the-counter or prescription medicines for pain, discomfort, or fever as directed by your caregiver. Do not give aspirin to children.   If your caregiver has given you a follow-up appointment, it is very important to keep that appointment. Not keeping the appointment could result in a permanent injury and/or lasting (chronic) pain and/or disability. If there is any problem keeping the appointment, you must call to reschedule.   SEEK IMMEDIATE MEDICAL CARE IF:   · Your pain is not gone in 24 hours.   · Your pain becomes worse, changes location, or feels different.   · You or your child has an oral temperature above 102° F (38.9° C), not controlled by medicine.   · Your baby is older than 3 months with a rectal temperature of 102° F (38.9° C) or higher.   · Your baby is 3 months old or younger with a rectal temperature of 100.4° F (38° C) or higher.   · You have shaking chills.   · You keep  throwing up (vomiting) or cannot drink liquids.   · There is blood in your vomit or you see blood in your bowel movements.   · Your bowel movements become dark or black.   · You have frequent bowel movements.   · Your bowel movements stop (become blocked) or you cannot pass gas.   · You have bloody, frequent, or painful urination.   · You have yellow discoloration in the skin or whites of the eyes.   · Your stomach becomes bloated or bigger.   · You have dizziness or fainting.   · You have chest or back pain.   MAKE SURE YOU:   · Understand these instructions.   · Will watch your condition.   · Will get help right away if you are not doing well or get worse.   Document Released: 12/18/2006 Document Revised: 03/11/2013 Document Reviewed: 11/15/2010  [employer]® Patient Information ©2013 [employer], [employer].

## 2017-03-17 LAB
BACTERIA UR CULT: NORMAL
SIGNIFICANT IND 70042: NORMAL
SITE SITE: NORMAL
SOURCE SOURCE: NORMAL

## 2018-01-07 ENCOUNTER — HOSPITAL ENCOUNTER (EMERGENCY)
Facility: MEDICAL CENTER | Age: 23
End: 2018-01-07
Attending: EMERGENCY MEDICINE
Payer: MEDICAID

## 2018-01-07 VITALS
DIASTOLIC BLOOD PRESSURE: 70 MMHG | OXYGEN SATURATION: 95 % | SYSTOLIC BLOOD PRESSURE: 119 MMHG | WEIGHT: 218.26 LBS | HEART RATE: 100 BPM | RESPIRATION RATE: 16 BRPM | HEIGHT: 63 IN | TEMPERATURE: 98.5 F | BODY MASS INDEX: 38.67 KG/M2

## 2018-01-07 DIAGNOSIS — R11.2 NON-INTRACTABLE VOMITING WITH NAUSEA, UNSPECIFIED VOMITING TYPE: ICD-10-CM

## 2018-01-07 LAB
ALBUMIN SERPL BCP-MCNC: 3.7 G/DL (ref 3.2–4.9)
ALBUMIN/GLOB SERPL: 1.2 G/DL
ALP SERPL-CCNC: 66 U/L (ref 30–99)
ALT SERPL-CCNC: 19 U/L (ref 2–50)
ANION GAP SERPL CALC-SCNC: 9 MMOL/L (ref 0–11.9)
AST SERPL-CCNC: 18 U/L (ref 12–45)
BASOPHILS # BLD AUTO: 0.5 % (ref 0–1.8)
BASOPHILS # BLD: 0.05 K/UL (ref 0–0.12)
BILIRUB SERPL-MCNC: 0.5 MG/DL (ref 0.1–1.5)
BUN SERPL-MCNC: 8 MG/DL (ref 8–22)
CALCIUM SERPL-MCNC: 8.9 MG/DL (ref 8.4–10.2)
CHLORIDE SERPL-SCNC: 103 MMOL/L (ref 96–112)
CO2 SERPL-SCNC: 26 MMOL/L (ref 20–33)
CREAT SERPL-MCNC: 0.71 MG/DL (ref 0.5–1.4)
EOSINOPHIL # BLD AUTO: 0.39 K/UL (ref 0–0.51)
EOSINOPHIL NFR BLD: 4 % (ref 0–6.9)
ERYTHROCYTE [DISTWIDTH] IN BLOOD BY AUTOMATED COUNT: 39.4 FL (ref 35.9–50)
GFR SERPL CREATININE-BSD FRML MDRD: >60 ML/MIN/1.73 M 2
GLOBULIN SER CALC-MCNC: 3 G/DL (ref 1.9–3.5)
GLUCOSE SERPL-MCNC: 82 MG/DL (ref 65–99)
HCT VFR BLD AUTO: 44.3 % (ref 37–47)
HGB BLD-MCNC: 15.3 G/DL (ref 12–16)
IMM GRANULOCYTES # BLD AUTO: 0.02 K/UL (ref 0–0.11)
IMM GRANULOCYTES NFR BLD AUTO: 0.2 % (ref 0–0.9)
LIPASE SERPL-CCNC: 25 U/L (ref 7–58)
LYMPHOCYTES # BLD AUTO: 3.46 K/UL (ref 1–4.8)
LYMPHOCYTES NFR BLD: 35.6 % (ref 22–41)
MCH RBC QN AUTO: 30.9 PG (ref 27–33)
MCHC RBC AUTO-ENTMCNC: 34.5 G/DL (ref 33.6–35)
MCV RBC AUTO: 89.5 FL (ref 81.4–97.8)
MONOCYTES # BLD AUTO: 0.62 K/UL (ref 0–0.85)
MONOCYTES NFR BLD AUTO: 6.4 % (ref 0–13.4)
NEUTROPHILS # BLD AUTO: 5.18 K/UL (ref 2–7.15)
NEUTROPHILS NFR BLD: 53.3 % (ref 44–72)
NRBC # BLD AUTO: 0 K/UL
NRBC BLD-RTO: 0 /100 WBC
PLATELET # BLD AUTO: 315 K/UL (ref 164–446)
PMV BLD AUTO: 10.1 FL (ref 9–12.9)
POTASSIUM SERPL-SCNC: 3.7 MMOL/L (ref 3.6–5.5)
PROT SERPL-MCNC: 6.7 G/DL (ref 6–8.2)
RBC # BLD AUTO: 4.95 M/UL (ref 4.2–5.4)
SODIUM SERPL-SCNC: 138 MMOL/L (ref 135–145)
WBC # BLD AUTO: 9.7 K/UL (ref 4.8–10.8)

## 2018-01-07 PROCEDURE — 96374 THER/PROPH/DIAG INJ IV PUSH: CPT

## 2018-01-07 PROCEDURE — 36415 COLL VENOUS BLD VENIPUNCTURE: CPT

## 2018-01-07 PROCEDURE — 85025 COMPLETE CBC W/AUTO DIFF WBC: CPT

## 2018-01-07 PROCEDURE — 80053 COMPREHEN METABOLIC PANEL: CPT

## 2018-01-07 PROCEDURE — 94760 N-INVAS EAR/PLS OXIMETRY 1: CPT

## 2018-01-07 PROCEDURE — 99284 EMERGENCY DEPT VISIT MOD MDM: CPT

## 2018-01-07 PROCEDURE — 83690 ASSAY OF LIPASE: CPT

## 2018-01-07 PROCEDURE — 700105 HCHG RX REV CODE 258: Performed by: EMERGENCY MEDICINE

## 2018-01-07 PROCEDURE — 96361 HYDRATE IV INFUSION ADD-ON: CPT

## 2018-01-07 PROCEDURE — 700111 HCHG RX REV CODE 636 W/ 250 OVERRIDE (IP): Performed by: EMERGENCY MEDICINE

## 2018-01-07 RX ORDER — ONDANSETRON 2 MG/ML
4 INJECTION INTRAMUSCULAR; INTRAVENOUS ONCE
Status: COMPLETED | OUTPATIENT
Start: 2018-01-07 | End: 2018-01-07

## 2018-01-07 RX ORDER — HYDROXYZINE HYDROCHLORIDE 25 MG/1
25 TABLET, FILM COATED ORAL EVERY EVENING
Status: SHIPPED | COMMUNITY
End: 2018-07-03

## 2018-01-07 RX ORDER — ONDANSETRON 4 MG/1
4 TABLET, ORALLY DISINTEGRATING ORAL EVERY 8 HOURS PRN
Status: SHIPPED | COMMUNITY
End: 2018-07-03

## 2018-01-07 RX ORDER — SODIUM CHLORIDE 9 MG/ML
1000 INJECTION, SOLUTION INTRAVENOUS ONCE
Status: COMPLETED | OUTPATIENT
Start: 2018-01-07 | End: 2018-01-07

## 2018-01-07 RX ADMIN — SODIUM CHLORIDE 1000 ML: 9 INJECTION, SOLUTION INTRAVENOUS at 15:14

## 2018-01-07 RX ADMIN — ONDANSETRON 4 MG: 2 INJECTION INTRAMUSCULAR; INTRAVENOUS at 15:15

## 2018-01-07 ASSESSMENT — PAIN SCALES - GENERAL: PAINLEVEL_OUTOF10: 0

## 2018-01-07 NOTE — ED NOTES
Patient was on a new antidepressant that made her feel tired and gave her nausea, was weaning off that medication because it made her sick. Went to [facility] last night because she was feeling hopeless, and they sent her home and recommended she come to the ED for dehydration.

## 2018-01-07 NOTE — ED PROVIDER NOTES
ED Provider Note    CHIEF COMPLAINT  Chief Complaint   Patient presents with   • Medication Reaction     Pt states stopped taking antidepressant on Friday, per PCP   • N/V     started Friday         HPI  Rosalba Leavitt is a 22 y.o. female who presentsTo the ED secondary nausea vomiting. The patient states that she was on antidepressants, Celexa for an extended period time, switched roughly 2 weeks ago and started having nausea vomiting diarrhea afterwards. This is been waxing and waning over the last 2 weeks, she followed up with well care who told her to wean off of her antidepressants. Ultimately went to [facility] last night because she was having some suicidal thoughts. The patient had artery stopped her antidepressants about 3 days ago. [facility] instructed the patient to come to the emergency department for IV hydration. The patient has been taking Zofran for it. The patient denies any suicidal ideation at this time. Patient states she has mild epigastric abdominal discomfort that waxes and wanes, no fevers, some diarrhea, no hematuria dysuria. Last initial period was approximately week ago, she denies any chance of pregnancy. Last vomiting was yesterday    REVIEW OF SYSTEMS  See HPI for further details. All other systems are negative.     PAST MEDICAL HISTORY  Past Medical History:   Diagnosis Date   • Anxiety    • Depression        FAMILY HISTORY  Family History   Problem Relation Age of Onset   • Adopted: Yes       SOCIAL HISTORY  Social History     Social History   • Marital status: Single     Spouse name: N/A   • Number of children: N/A   • Years of education: N/A     Social History Main Topics   • Smoking status: Current Every Day Smoker   • Smokeless tobacco: Not on file   • Alcohol use Yes   • Drug use: No   • Sexual activity: No     Other Topics Concern   • Not on file     Social History Narrative   • No narrative on file       SURGICAL HISTORY  History reviewed. No pertinent surgical  "history.    CURRENT MEDICATIONS  Home Medications     Reviewed by Rosalie Peterson (Pharmacy Tech) on 01/07/18 at 1458  Med List Status: Complete   Medication Last Dose Status   hydrOXYzine HCl (ATARAX) 25 MG Tab 1/5/2018 Active   ondansetron (ZOFRAN ODT) 4 MG TABLET DISPERSIBLE 1/6/2018 Active                ALLERGIES  No Known Allergies    PHYSICAL EXAM  VITAL SIGNS: /70   Pulse 100   Temp 36.9 °C (98.5 °F)   Resp 16   Ht 1.6 m (5' 3\")   Wt 99 kg (218 lb 4.1 oz)   LMP 12/31/2017   SpO2 95%   BMI 38.66 kg/m²   Constitutional: Well developed, Well nourished, mild distress, Non-toxic appearance.   HENT: Normocephalic, Atraumatic, Bilateral external ears normal, Oropharynx dry, No oral exudates, Nose normal.   Eyes: PERRLA, EOMI, Conjunctiva normal, No discharge.   Neck: Normal range of motion, No tenderness, Supple, No stridor.   Lymphatic: No lymphadenopathy noted.   Cardiovascular: Slightly tachycardic  Thorax & Lungs: Normal breath sounds, No respiratory distress, No wheezing, No chest tenderness.   Abdomen: Mild epigastric abdominal tenderness to palpation, no rebound, no peritoneal signs  Skin: Warm, Dry, No erythema, No rash.   Back: No tenderness, No CVA tenderness.   Extremities: Intact distal pulses, No edema, No tenderness.   Neurologic: Alert & oriented x 3, moves all extremities spontaneously.     COURSE & MEDICAL DECISION MAKING  Pertinent Labs & Imaging studies reviewed. (See chart for details)  Nausea vomiting diarrhea, likely withdrawal syndrome secondary to antidepressants. We will give the patient IV fluids, Zofran, check her electrolytes and laboratory tests. We'll give the patient IV fluids due to dry mucous membranes and tachycardia.    Patient is feeling improved after IV fluids, And Zofran. Repeat abdominal examination is benign, we'll discharge the patient home, have her follow up with her therapist and psychiatrist, have the patient return with worsening symptoms.    FINAL " IMPRESSION  1. Non-intractable vomiting with nausea, unspecified vomiting type        Patient referred to primary care provider for blood pressure management    This dictation was created using voice recognition software. The accuracy of the dictation is limited to the abilities of the software. I expect there may be some errors of grammar and possibly content. The nursing notes were reviewed and certain aspects of this information were incorporated into this note.    Electronically signed by: Ivan Davis, 1/7/2018 3:00 PM

## 2018-01-07 NOTE — ED NOTES
"Med rec updated and complete  Allergies reviewed  Pt states \"I'm not sure what antidepressant I take\".  Called [facility] @ [phone] to verify antidepressant.  Went back asked pt last time she took it.  Pt states \"No antibiotics in the last 30 days\".  Pt states \"No vitamins or OTC'S\".     "

## 2018-01-07 NOTE — ED NOTES
"Chief Complaint   Patient presents with   • Medication Reaction     Pt states stopped taking antidepressant on Friday, per PCP   • N/V     started Friday     /91   Pulse (!) 104   Temp 36.9 °C (98.5 °F)   Resp 16   Ht 1.6 m (5' 3\")   Wt 99 kg (218 lb 4.1 oz)   LMP 12/31/2017   SpO2 98%   BMI 38.66 kg/m²     "

## 2018-01-08 NOTE — DISCHARGE INSTRUCTIONS
Please follow-up with your primary care provider for blood pressure management.        Nausea and Vomiting  Nausea means you feel sick to your stomach. Throwing up (vomiting) is a reflex where stomach contents come out of your mouth.  HOME CARE   · Take medicine as told by your doctor.  · Do not force yourself to eat. However, you do need to drink fluids.  · If you feel like eating, eat a normal diet as told by your doctor.  ¨ Eat rice, wheat, potatoes, bread, lean meats, yogurt, fruits, and vegetables.  ¨ Avoid high-fat foods.  · Drink enough fluids to keep your pee (urine) clear or pale yellow.  · Ask your doctor how to replace body fluid losses (rehydrate). Signs of body fluid loss (dehydration) include:  ¨ Feeling very thirsty.  ¨ Dry lips and mouth.  ¨ Feeling dizzy.  ¨ Dark pee.  ¨ Peeing less than normal.  ¨ Feeling confused.  ¨ Fast breathing or heart rate.  GET HELP RIGHT AWAY IF:   · You have blood in your throw up.  · You have black or bloody poop (stool).  · You have a bad headache or stiff neck.  · You feel confused.  · You have bad belly (abdominal) pain.  · You have chest pain or trouble breathing.  · You do not pee at least once every 8 hours.  · You have cold, clammy skin.  · You keep throwing up after 24 to 48 hours.  · You have a fever.  MAKE SURE YOU:   · Understand these instructions.  · Will watch your condition.  · Will get help right away if you are not doing well or get worse.     This information is not intended to replace advice given to you by your health care provider. Make sure you discuss any questions you have with your health care provider.     Document Released: 06/05/2009 Document Revised: 03/11/2013 Document Reviewed: 05/18/2012  [employer] Interactive Patient Education ©2016 [employer]

## 2018-01-08 NOTE — ED NOTES
Patient and family understand discharge instructions. Will follow up with her mental health provider. Will return as needed.

## 2018-07-03 ENCOUNTER — APPOINTMENT (OUTPATIENT)
Dept: RADIOLOGY | Facility: MEDICAL CENTER | Age: 23
End: 2018-07-03
Attending: EMERGENCY MEDICINE
Payer: COMMERCIAL

## 2018-07-03 ENCOUNTER — HOSPITAL ENCOUNTER (EMERGENCY)
Facility: MEDICAL CENTER | Age: 23
End: 2018-07-03
Attending: EMERGENCY MEDICINE
Payer: COMMERCIAL

## 2018-07-03 VITALS
SYSTOLIC BLOOD PRESSURE: 136 MMHG | WEIGHT: 185 LBS | RESPIRATION RATE: 18 BRPM | OXYGEN SATURATION: 98 % | BODY MASS INDEX: 32.78 KG/M2 | HEIGHT: 63 IN | TEMPERATURE: 99.1 F | DIASTOLIC BLOOD PRESSURE: 78 MMHG | HEART RATE: 98 BPM

## 2018-07-03 DIAGNOSIS — S82.891A CLOSED FRACTURE OF RIGHT ANKLE, INITIAL ENCOUNTER: ICD-10-CM

## 2018-07-03 PROCEDURE — 302875 HCHG BANDAGE ACE 4 OR 6""

## 2018-07-03 PROCEDURE — 29515 APPLICATION SHORT LEG SPLINT: CPT

## 2018-07-03 PROCEDURE — 302874 HCHG BANDAGE ACE 2 OR 3""

## 2018-07-03 PROCEDURE — 73610 X-RAY EXAM OF ANKLE: CPT | Mod: RT

## 2018-07-03 PROCEDURE — 73590 X-RAY EXAM OF LOWER LEG: CPT | Mod: RT

## 2018-07-03 PROCEDURE — 99284 EMERGENCY DEPT VISIT MOD MDM: CPT

## 2018-07-03 RX ORDER — HYDROXYZINE PAMOATE 100 MG
100 CAPSULE ORAL EVERY EVENING
Status: SHIPPED | COMMUNITY
End: 2018-11-29 | Stop reason: CLARIF

## 2018-07-03 RX ORDER — OXYCODONE HYDROCHLORIDE AND ACETAMINOPHEN 5; 325 MG/1; MG/1
1-2 TABLET ORAL EVERY 4 HOURS PRN
Qty: 20 TAB | Refills: 0 | Status: SHIPPED | OUTPATIENT
Start: 2018-07-03 | End: 2018-07-08

## 2018-07-03 ASSESSMENT — PAIN SCALES - GENERAL: PAINLEVEL_OUTOF10: 9

## 2018-07-03 NOTE — ED PROVIDER NOTES
"ED Provider Note    CHIEF COMPLAINT   Chief Complaint   Patient presents with   • Ankle Injury     Pt was walking in her living room \"then I hear a pop\" and states she fall and c/o ankle pain and limited ROM since then. CMS intac but obvious deformity noted.        HPI   Rosalba Leavitt is a 23 y.o. female who presents complaining of right ankle pain.  Patient states she was walking and felt a pop and she collapsed to the floor.  Patient denies knee pain or neck chest back or abdominal pain.  Patient states she was simply walking at the time    REVIEW OF SYSTEMS   See HPI for further details.  No numbness tingling or weakness.  No cough or cold symptoms.  No vomiting or diarrhea    PAST MEDICAL HISTORY   Past Medical History:   Diagnosis Date   • Anxiety    • Depression        FAMILY HISTORY  Family History   Problem Relation Age of Onset   • Adopted: Yes       SOCIAL HISTORY  Social History     Social History   • Marital status: Single     Spouse name: N/A   • Number of children: N/A   • Years of education: N/A     Social History Main Topics   • Smoking status: Current Every Day Smoker     Packs/day: 0.10     Types: Cigarettes   • Smokeless tobacco: Never Used   • Alcohol use No   • Drug use: No   • Sexual activity: No     Other Topics Concern   • Not on file     Social History Narrative   • No narrative on file       SURGICAL HISTORY  History reviewed. No pertinent surgical history.    CURRENT MEDICATIONS   Home Medications     Reviewed by Rosalie Salter (Pharmacy Tech) on 07/03/18 at 1359  Med List Status: Complete   Medication Last Dose Status   hydrOXYzine pamoate (VISTARIL) 100 MG Cap 7/2/2018 Active                ALLERGIES   No Known Allergies    PHYSICAL EXAM  VITAL SIGNS: /97   Pulse 99   Temp 37.3 °C (99.1 °F)   Resp 18   Ht 1.6 m (5' 3\")   Wt 83.9 kg (185 lb)   LMP 06/26/2018   SpO2 94%   BMI 32.77 kg/m²   Constitutional: Well developed, Well nourished, No acute distress, " Non-toxic appearance.   Cardiovascular: Normal heart rate, Normal rhythm, No murmurs, No rubs, No gallops.   Thorax & Lungs: Normal breath sounds, No respiratory distress, No wheezing, No chest tenderness.   Abdomen: Bowel sounds normal, Soft, No tenderness, No masses, No pulsatile masses.   Skin: Warm, Dry, No erythema, No rash.   Extremities: Intact distal pulses, No cyanosis, No clubbing.   Musculoskeletal: Obvious deformity of the right lateral malleolus with moderate soft tissue swelling over the distal third of the fibula.  Minimal tenderness over the proximal fibula.  Minimal tenderness over the right fifth proximal metatarsal.  Dorsal pedal pulses 2+ equal bilaterally  Neurologic: Alert & oriented x 3, Normal motor function, Normal sensory function, No focal deficits noted.     RADIOLOGY/PROCEDURES  DX-ANKLE 3+ VIEWS RIGHT   Final Result      Fracture of the lateral malleolus which extends proximally 3 cm from the level of the ankle joint consistent with a Lui B injury.      DX-TIBIA AND FIBULA RIGHT   Final Result      Fracture of the lateral malleolus.            Splint note: Patient has a minimally displaced ankle fracture.  Patient was placed in a well-padded posterior splint with a sugar tong component by the  tech.  It was rechecked by myself.  The fracture is well immobilized in the patient's neurovascular status is intact.    COURSE & MEDICAL DECISION MAKING  Pertinent Labs & Imaging studies reviewed. (See chart for details)  I contacted the orthopedic surgeon Dr. Reed.  He is reviewed the films made me.  He feels that his splint would be better than a boot and he will follow next week and get weightbearing x-rays before making decision about surgery.  I discussed this with the patient.  Patient was discharged home in stable condition.  Disposition patient was discharged home in stable condition    FINAL IMPRESSION  1.  Ankle fracture  2.   3.        Electronically signed by: Noe Macdonald,  7/3/2018 3:00 PM

## 2018-07-03 NOTE — ED NOTES
".  Chief Complaint   Patient presents with   • Ankle Injury     Pt was walking in her living room \"then I hear a pop\" and states she fall and c/o ankle pain and limited ROM since then. CMS intac but obvious deformity noted.      .Blood pressure 145/97, pulse 99, temperature 37.3 °C (99.1 °F), resp. rate 18, height 1.6 m (5' 3\"), weight 83.9 kg (185 lb), SpO2 94 %.      "

## 2018-11-28 ENCOUNTER — HOSPITAL ENCOUNTER (EMERGENCY)
Dept: HOSPITAL 8 - ED | Age: 23
Discharge: HOME | End: 2018-11-28
Payer: MEDICAID

## 2018-11-28 VITALS — BODY MASS INDEX: 40.23 KG/M2 | HEIGHT: 63 IN | WEIGHT: 227.08 LBS

## 2018-11-28 VITALS — DIASTOLIC BLOOD PRESSURE: 77 MMHG | SYSTOLIC BLOOD PRESSURE: 132 MMHG

## 2018-11-28 DIAGNOSIS — R11.2: ICD-10-CM

## 2018-11-28 DIAGNOSIS — R10.9: ICD-10-CM

## 2018-11-28 DIAGNOSIS — R19.7: Primary | ICD-10-CM

## 2018-11-28 DIAGNOSIS — F17.200: ICD-10-CM

## 2018-11-28 LAB
ALBUMIN SERPL-MCNC: 3.3 G/DL (ref 3.4–5)
ALP SERPL-CCNC: 63 U/L (ref 45–117)
ALT SERPL-CCNC: 21 U/L (ref 12–78)
ANION GAP SERPL CALC-SCNC: 9 MMOL/L (ref 5–15)
BASOPHILS # BLD AUTO: 0.05 X10^3/UL (ref 0–0.1)
BASOPHILS NFR BLD AUTO: 1 % (ref 0–1)
BILIRUB SERPL-MCNC: 0.2 MG/DL (ref 0.2–1)
CALCIUM SERPL-MCNC: 8.7 MG/DL (ref 8.5–10.1)
CHLORIDE SERPL-SCNC: 109 MMOL/L (ref 98–107)
CREAT SERPL-MCNC: 0.58 MG/DL (ref 0.55–1.02)
CULTURE INDICATED?: NO
EOSINOPHIL # BLD AUTO: 0.16 X10^3/UL (ref 0–0.4)
EOSINOPHIL NFR BLD AUTO: 2 % (ref 1–7)
ERYTHROCYTE [DISTWIDTH] IN BLOOD BY AUTOMATED COUNT: 12.7 % (ref 9.6–15.2)
LYMPHOCYTES # BLD AUTO: 2.8 X10^3/UL (ref 1–3.4)
LYMPHOCYTES NFR BLD AUTO: 31 % (ref 22–44)
MCH RBC QN AUTO: 30.7 PG (ref 27–34.8)
MCHC RBC AUTO-ENTMCNC: 34 G/DL (ref 32.4–35.8)
MCV RBC AUTO: 90.2 FL (ref 80–100)
MD: NO
MICROSCOPIC: (no result)
MONOCYTES # BLD AUTO: 0.44 X10^3/UL (ref 0.2–0.8)
MONOCYTES NFR BLD AUTO: 5 % (ref 2–9)
NEUTROPHILS # BLD AUTO: 5.64 X10^3/UL (ref 1.8–6.8)
NEUTROPHILS NFR BLD AUTO: 62 % (ref 42–75)
PLATELET # BLD AUTO: 345 X10^3/UL (ref 130–400)
PMV BLD AUTO: 9.6 FL (ref 7.4–10.4)
PROT SERPL-MCNC: 7.4 G/DL (ref 6.4–8.2)
RBC # BLD AUTO: 4.63 X10^6/UL (ref 3.82–5.3)

## 2018-11-28 PROCEDURE — 84703 CHORIONIC GONADOTROPIN ASSAY: CPT

## 2018-11-28 PROCEDURE — 99283 EMERGENCY DEPT VISIT LOW MDM: CPT

## 2018-11-28 PROCEDURE — 85025 COMPLETE CBC W/AUTO DIFF WBC: CPT

## 2018-11-28 PROCEDURE — 80053 COMPREHEN METABOLIC PANEL: CPT

## 2018-11-28 PROCEDURE — 81003 URINALYSIS AUTO W/O SCOPE: CPT

## 2018-11-28 PROCEDURE — 96374 THER/PROPH/DIAG INJ IV PUSH: CPT

## 2018-11-28 PROCEDURE — 36415 COLL VENOUS BLD VENIPUNCTURE: CPT

## 2018-11-28 PROCEDURE — 83605 ASSAY OF LACTIC ACID: CPT

## 2018-11-28 PROCEDURE — 96361 HYDRATE IV INFUSION ADD-ON: CPT

## 2018-11-29 ENCOUNTER — APPOINTMENT (OUTPATIENT)
Dept: RADIOLOGY | Facility: MEDICAL CENTER | Age: 23
End: 2018-11-29
Attending: EMERGENCY MEDICINE
Payer: MEDICAID

## 2018-11-29 ENCOUNTER — HOSPITAL ENCOUNTER (OUTPATIENT)
Facility: MEDICAL CENTER | Age: 23
End: 2018-11-30
Attending: EMERGENCY MEDICINE | Admitting: INTERNAL MEDICINE
Payer: MEDICAID

## 2018-11-29 DIAGNOSIS — R07.9 CHEST PAIN, UNSPECIFIED TYPE: ICD-10-CM

## 2018-11-29 DIAGNOSIS — R10.13 EPIGASTRIC PAIN: ICD-10-CM

## 2018-11-29 PROBLEM — F43.23 ADJUSTMENT REACTION WITH ANXIETY AND DEPRESSION: Status: ACTIVE | Noted: 2018-11-29

## 2018-11-29 PROBLEM — K52.9 ENTERITIS: Status: ACTIVE | Noted: 2018-11-29

## 2018-11-29 LAB
ALBUMIN SERPL BCP-MCNC: 3.6 G/DL (ref 3.2–4.9)
ALBUMIN/GLOB SERPL: 1 G/DL
ALP SERPL-CCNC: 56 U/L (ref 30–99)
ALT SERPL-CCNC: 19 U/L (ref 2–50)
AMPHETAMINES UR QL: NEGATIVE
ANION GAP SERPL CALC-SCNC: 7 MMOL/L (ref 0–11.9)
APPEARANCE UR: CLEAR
AST SERPL-CCNC: 21 U/L (ref 12–45)
BARBITURATES UR QL SCN: NEGATIVE
BASOPHILS # BLD AUTO: 0.7 % (ref 0–1.8)
BASOPHILS # BLD: 0.06 K/UL (ref 0–0.12)
BENZODIAZ UR QL SCN: NEGATIVE
BILIRUB SERPL-MCNC: 0.3 MG/DL (ref 0.1–1.5)
BILIRUB UR QL STRIP.AUTO: NEGATIVE
BUN SERPL-MCNC: 6 MG/DL (ref 8–22)
BZE UR QL SCN: NEGATIVE
CALCIUM SERPL-MCNC: 9 MG/DL (ref 8.4–10.2)
CHLORIDE SERPL-SCNC: 104 MMOL/L (ref 96–112)
CO2 SERPL-SCNC: 22 MMOL/L (ref 20–33)
COLOR UR: YELLOW
CREAT SERPL-MCNC: 0.74 MG/DL (ref 0.5–1.4)
CRP SERPL HS-MCNC: 1.44 MG/DL (ref 0–0.75)
EKG IMPRESSION: NORMAL
EOSINOPHIL # BLD AUTO: 0.14 K/UL (ref 0–0.51)
EOSINOPHIL NFR BLD: 1.6 % (ref 0–6.9)
ERYTHROCYTE [DISTWIDTH] IN BLOOD BY AUTOMATED COUNT: 39.5 FL (ref 35.9–50)
ERYTHROCYTE [SEDIMENTATION RATE] IN BLOOD BY WESTERGREN METHOD: 20 MM/HOUR (ref 0–20)
GLOBULIN SER CALC-MCNC: 3.6 G/DL (ref 1.9–3.5)
GLUCOSE SERPL-MCNC: 103 MG/DL (ref 65–99)
GLUCOSE UR STRIP.AUTO-MCNC: NEGATIVE MG/DL
HCG SERPL QL: NEGATIVE
HCT VFR BLD AUTO: 42.6 % (ref 37–47)
HGB BLD-MCNC: 14.5 G/DL (ref 12–16)
IMM GRANULOCYTES # BLD AUTO: 0.01 K/UL (ref 0–0.11)
IMM GRANULOCYTES NFR BLD AUTO: 0.1 % (ref 0–0.9)
KETONES UR STRIP.AUTO-MCNC: NEGATIVE MG/DL
LEUKOCYTE ESTERASE UR QL STRIP.AUTO: NEGATIVE
LIPASE SERPL-CCNC: 24 U/L (ref 7–58)
LYMPHOCYTES # BLD AUTO: 3.12 K/UL (ref 1–4.8)
LYMPHOCYTES NFR BLD: 36.5 % (ref 22–41)
MCH RBC QN AUTO: 30.3 PG (ref 27–33)
MCHC RBC AUTO-ENTMCNC: 34 G/DL (ref 33.6–35)
MCV RBC AUTO: 88.9 FL (ref 81.4–97.8)
MICRO URNS: NORMAL
MONOCYTES # BLD AUTO: 0.41 K/UL (ref 0–0.85)
MONOCYTES NFR BLD AUTO: 4.8 % (ref 0–13.4)
NEUTROPHILS # BLD AUTO: 4.8 K/UL (ref 2–7.15)
NEUTROPHILS NFR BLD: 56.3 % (ref 44–72)
NITRITE UR QL STRIP.AUTO: NEGATIVE
NRBC # BLD AUTO: 0 K/UL
NRBC BLD-RTO: 0 /100 WBC
PCP UR QL SCN: NEGATIVE
PH UR STRIP.AUTO: 7 [PH]
PLATELET # BLD AUTO: 355 K/UL (ref 164–446)
PMV BLD AUTO: 10.7 FL (ref 9–12.9)
POTASSIUM SERPL-SCNC: 3.6 MMOL/L (ref 3.6–5.5)
PROT SERPL-MCNC: 7.2 G/DL (ref 6–8.2)
PROT UR QL STRIP: NEGATIVE MG/DL
RBC # BLD AUTO: 4.79 M/UL (ref 4.2–5.4)
RBC UR QL AUTO: NEGATIVE
SODIUM SERPL-SCNC: 133 MMOL/L (ref 135–145)
SP GR UR STRIP.AUTO: 1.01
TROPONIN I SERPL-MCNC: 0.41 NG/ML (ref 0–0.04)
TROPONIN I SERPL-MCNC: <0.02 NG/ML (ref 0–0.04)
UR OPIATES 2659: POSITIVE
UR THC 2511T: NEGATIVE
WBC # BLD AUTO: 8.5 K/UL (ref 4.8–10.8)

## 2018-11-29 PROCEDURE — 84703 CHORIONIC GONADOTROPIN ASSAY: CPT

## 2018-11-29 PROCEDURE — 96372 THER/PROPH/DIAG INJ SC/IM: CPT | Mod: XU

## 2018-11-29 PROCEDURE — 700111 HCHG RX REV CODE 636 W/ 250 OVERRIDE (IP): Performed by: EMERGENCY MEDICINE

## 2018-11-29 PROCEDURE — G0378 HOSPITAL OBSERVATION PER HR: HCPCS

## 2018-11-29 PROCEDURE — 36415 COLL VENOUS BLD VENIPUNCTURE: CPT

## 2018-11-29 PROCEDURE — 85025 COMPLETE CBC W/AUTO DIFF WBC: CPT

## 2018-11-29 PROCEDURE — 71045 X-RAY EXAM CHEST 1 VIEW: CPT

## 2018-11-29 PROCEDURE — 700117 HCHG RX CONTRAST REV CODE 255: Performed by: EMERGENCY MEDICINE

## 2018-11-29 PROCEDURE — 96375 TX/PRO/DX INJ NEW DRUG ADDON: CPT | Mod: XU

## 2018-11-29 PROCEDURE — 700111 HCHG RX REV CODE 636 W/ 250 OVERRIDE (IP)

## 2018-11-29 PROCEDURE — 71275 CT ANGIOGRAPHY CHEST: CPT

## 2018-11-29 PROCEDURE — 84484 ASSAY OF TROPONIN QUANT: CPT

## 2018-11-29 PROCEDURE — 93005 ELECTROCARDIOGRAM TRACING: CPT | Performed by: EMERGENCY MEDICINE

## 2018-11-29 PROCEDURE — 99285 EMERGENCY DEPT VISIT HI MDM: CPT

## 2018-11-29 PROCEDURE — 76705 ECHO EXAM OF ABDOMEN: CPT

## 2018-11-29 PROCEDURE — 85652 RBC SED RATE AUTOMATED: CPT

## 2018-11-29 PROCEDURE — 83690 ASSAY OF LIPASE: CPT

## 2018-11-29 PROCEDURE — 83036 HEMOGLOBIN GLYCOSYLATED A1C: CPT

## 2018-11-29 PROCEDURE — 96374 THER/PROPH/DIAG INJ IV PUSH: CPT | Mod: XU

## 2018-11-29 PROCEDURE — 80053 COMPREHEN METABOLIC PANEL: CPT

## 2018-11-29 PROCEDURE — 80305 DRUG TEST PRSMV DIR OPT OBS: CPT

## 2018-11-29 PROCEDURE — 86140 C-REACTIVE PROTEIN: CPT

## 2018-11-29 PROCEDURE — 99218 PR INITIAL OBSERVATION CARE,LEVL I: CPT | Performed by: INTERNAL MEDICINE

## 2018-11-29 PROCEDURE — 81003 URINALYSIS AUTO W/O SCOPE: CPT | Mod: XU

## 2018-11-29 RX ORDER — ONDANSETRON 2 MG/ML
4 INJECTION INTRAMUSCULAR; INTRAVENOUS EVERY 4 HOURS PRN
Status: DISCONTINUED | OUTPATIENT
Start: 2018-11-29 | End: 2018-11-30 | Stop reason: HOSPADM

## 2018-11-29 RX ORDER — CLONAZEPAM 0.5 MG/1
1 TABLET ORAL EVERY EVENING
Status: DISCONTINUED | OUTPATIENT
Start: 2018-11-29 | End: 2018-11-30 | Stop reason: HOSPADM

## 2018-11-29 RX ORDER — PROMETHAZINE HYDROCHLORIDE 25 MG/1
12.5-25 SUPPOSITORY RECTAL EVERY 4 HOURS PRN
Status: DISCONTINUED | OUTPATIENT
Start: 2018-11-29 | End: 2018-11-30 | Stop reason: HOSPADM

## 2018-11-29 RX ORDER — ONDANSETRON 4 MG/1
4 TABLET, ORALLY DISINTEGRATING ORAL EVERY 6 HOURS PRN
Status: ON HOLD | COMMUNITY
End: 2021-03-15

## 2018-11-29 RX ORDER — ONDANSETRON 4 MG/1
4 TABLET, ORALLY DISINTEGRATING ORAL EVERY 4 HOURS PRN
Status: DISCONTINUED | OUTPATIENT
Start: 2018-11-29 | End: 2018-11-30 | Stop reason: HOSPADM

## 2018-11-29 RX ORDER — MORPHINE SULFATE 4 MG/ML
4 INJECTION, SOLUTION INTRAMUSCULAR; INTRAVENOUS ONCE
Status: COMPLETED | OUTPATIENT
Start: 2018-11-29 | End: 2018-11-29

## 2018-11-29 RX ORDER — PROMETHAZINE HYDROCHLORIDE 25 MG/1
12.5-25 TABLET ORAL EVERY 4 HOURS PRN
Status: DISCONTINUED | OUTPATIENT
Start: 2018-11-29 | End: 2018-11-30 | Stop reason: HOSPADM

## 2018-11-29 RX ORDER — ONDANSETRON 2 MG/ML
4 INJECTION INTRAMUSCULAR; INTRAVENOUS ONCE
Status: COMPLETED | OUTPATIENT
Start: 2018-11-29 | End: 2018-11-29

## 2018-11-29 RX ORDER — ACETAMINOPHEN 325 MG/1
650 TABLET ORAL EVERY 6 HOURS PRN
Status: DISCONTINUED | OUTPATIENT
Start: 2018-11-29 | End: 2018-11-30 | Stop reason: HOSPADM

## 2018-11-29 RX ORDER — DIPHENOXYLATE HYDROCHLORIDE AND ATROPINE SULFATE 2.5; .025 MG/1; MG/1
2 TABLET ORAL EVERY 6 HOURS PRN
Status: ON HOLD | COMMUNITY
End: 2021-03-15

## 2018-11-29 RX ORDER — METOCLOPRAMIDE HYDROCHLORIDE 5 MG/ML
10 INJECTION INTRAMUSCULAR; INTRAVENOUS ONCE
Status: COMPLETED | OUTPATIENT
Start: 2018-11-29 | End: 2018-11-29

## 2018-11-29 RX ADMIN — IOHEXOL 75 ML: 350 INJECTION, SOLUTION INTRAVENOUS at 19:37

## 2018-11-29 RX ADMIN — METOCLOPRAMIDE 10 MG: 5 INJECTION, SOLUTION INTRAMUSCULAR; INTRAVENOUS at 19:54

## 2018-11-29 RX ADMIN — ENOXAPARIN SODIUM 40 MG: 100 INJECTION SUBCUTANEOUS at 22:38

## 2018-11-29 RX ADMIN — ONDANSETRON 4 MG: 2 INJECTION INTRAMUSCULAR; INTRAVENOUS at 16:52

## 2018-11-29 RX ADMIN — MORPHINE SULFATE 4 MG: 4 INJECTION INTRAVENOUS at 16:52

## 2018-11-29 ASSESSMENT — COPD QUESTIONNAIRES
DO YOU EVER COUGH UP ANY MUCUS OR PHLEGM?: NO/ONLY WITH OCCASIONAL COLDS OR INFECTIONS
IN THE PAST 12 MONTHS DO YOU DO LESS THAN YOU USED TO BECAUSE OF YOUR BREATHING PROBLEMS: DISAGREE/UNSURE
HAVE YOU SMOKED AT LEAST 100 CIGARETTES IN YOUR ENTIRE LIFE: NO/DON'T KNOW
COPD SCREENING SCORE: 0
DURING THE PAST 4 WEEKS HOW MUCH DID YOU FEEL SHORT OF BREATH: NONE/LITTLE OF THE TIME

## 2018-11-29 ASSESSMENT — ENCOUNTER SYMPTOMS
VOMITING: 1
COUGH: 0
CHILLS: 0
BACK PAIN: 0
SHORTNESS OF BREATH: 0
ABDOMINAL PAIN: 1
NERVOUS/ANXIOUS: 1
NAUSEA: 1
DIARRHEA: 1
FEVER: 1
HEADACHES: 0
SORE THROAT: 0
INSOMNIA: 1
DIZZINESS: 0
PALPITATIONS: 0
WEAKNESS: 0

## 2018-11-29 ASSESSMENT — PATIENT HEALTH QUESTIONNAIRE - PHQ9
1. LITTLE INTEREST OR PLEASURE IN DOING THINGS: NOT AT ALL
2. FEELING DOWN, DEPRESSED, IRRITABLE, OR HOPELESS: NOT AT ALL
SUM OF ALL RESPONSES TO PHQ9 QUESTIONS 1 AND 2: 0

## 2018-11-29 ASSESSMENT — COGNITIVE AND FUNCTIONAL STATUS - GENERAL
SUGGESTED CMS G CODE MODIFIER DAILY ACTIVITY: CH
DAILY ACTIVITIY SCORE: 24
SUGGESTED CMS G CODE MODIFIER MOBILITY: CH
MOBILITY SCORE: 24

## 2018-11-29 ASSESSMENT — LIFESTYLE VARIABLES
SUBSTANCE_ABUSE: 0
ALCOHOL_USE: NO
EVER_SMOKED: NEVER

## 2018-11-29 ASSESSMENT — PAIN SCALES - GENERAL
PAINLEVEL_OUTOF10: 6
PAINLEVEL_OUTOF10: 0

## 2018-11-29 NOTE — ED TRIAGE NOTES
"Chief Complaint   Patient presents with   • Dizziness     x 4 days   • N/V   • Abdominal Pain     x 4 days     /96   Pulse 94   Temp 36.4 °C (97.6 °F) (Temporal)   Resp 16   Ht 1.6 m (5' 3\")   Wt 68.5 kg (151 lb 0.2 oz)   LMP 11/08/2018   SpO2 96%   BMI 26.75 kg/m²     Pt reports was seen last NOC at [facility] for c/o same.  "

## 2018-11-30 ENCOUNTER — PATIENT OUTREACH (OUTPATIENT)
Dept: HEALTH INFORMATION MANAGEMENT | Facility: OTHER | Age: 23
End: 2018-11-30

## 2018-11-30 ENCOUNTER — APPOINTMENT (OUTPATIENT)
Dept: CARDIOLOGY | Facility: MEDICAL CENTER | Age: 23
End: 2018-11-30
Attending: HOSPITALIST
Payer: MEDICAID

## 2018-11-30 VITALS
HEIGHT: 63 IN | RESPIRATION RATE: 16 BRPM | DIASTOLIC BLOOD PRESSURE: 78 MMHG | OXYGEN SATURATION: 94 % | SYSTOLIC BLOOD PRESSURE: 135 MMHG | BODY MASS INDEX: 26.76 KG/M2 | WEIGHT: 151.01 LBS | HEART RATE: 89 BPM | TEMPERATURE: 99 F

## 2018-11-30 LAB
CHOLEST SERPL-MCNC: 189 MG/DL (ref 100–199)
EST. AVERAGE GLUCOSE BLD GHB EST-MCNC: 111 MG/DL
HBA1C MFR BLD: 5.5 % (ref 0–5.6)
HDLC SERPL-MCNC: 53 MG/DL
LDLC SERPL CALC-MCNC: 109 MG/DL
LV EJECT FRACT  99904: 65
LV EJECT FRACT MOD 2C 99903: 75.36
LV EJECT FRACT MOD 4C 99902: 72.22
LV EJECT FRACT MOD BP 99901: 74.52
TRIGL SERPL-MCNC: 133 MG/DL (ref 0–149)
TROPONIN I SERPL-MCNC: <0.02 NG/ML (ref 0–0.04)

## 2018-11-30 PROCEDURE — A9270 NON-COVERED ITEM OR SERVICE: HCPCS | Performed by: INTERNAL MEDICINE

## 2018-11-30 PROCEDURE — 90686 IIV4 VACC NO PRSV 0.5 ML IM: CPT | Performed by: HOSPITALIST

## 2018-11-30 PROCEDURE — 80061 LIPID PANEL: CPT

## 2018-11-30 PROCEDURE — 700111 HCHG RX REV CODE 636 W/ 250 OVERRIDE (IP): Performed by: INTERNAL MEDICINE

## 2018-11-30 PROCEDURE — 84484 ASSAY OF TROPONIN QUANT: CPT

## 2018-11-30 PROCEDURE — 99243 OFF/OP CNSLTJ NEW/EST LOW 30: CPT | Performed by: INTERNAL MEDICINE

## 2018-11-30 PROCEDURE — 93306 TTE W/DOPPLER COMPLETE: CPT | Mod: 26 | Performed by: INTERNAL MEDICINE

## 2018-11-30 PROCEDURE — 700102 HCHG RX REV CODE 250 W/ 637 OVERRIDE(OP): Performed by: INTERNAL MEDICINE

## 2018-11-30 PROCEDURE — 93306 TTE W/DOPPLER COMPLETE: CPT

## 2018-11-30 PROCEDURE — 96372 THER/PROPH/DIAG INJ SC/IM: CPT

## 2018-11-30 PROCEDURE — 99217 PR OBSERVATION CARE DISCHARGE: CPT | Performed by: HOSPITALIST

## 2018-11-30 PROCEDURE — G0378 HOSPITAL OBSERVATION PER HR: HCPCS

## 2018-11-30 PROCEDURE — 700111 HCHG RX REV CODE 636 W/ 250 OVERRIDE (IP): Performed by: HOSPITALIST

## 2018-11-30 PROCEDURE — 90471 IMMUNIZATION ADMIN: CPT

## 2018-11-30 RX ADMIN — CLONAZEPAM 1 MG: 0.5 TABLET ORAL at 00:04

## 2018-11-30 RX ADMIN — ENOXAPARIN SODIUM 40 MG: 100 INJECTION SUBCUTANEOUS at 10:06

## 2018-11-30 RX ADMIN — INFLUENZA A VIRUS A/MICHIGAN/45/2015 X-275 (H1N1) ANTIGEN (FORMALDEHYDE INACTIVATED), INFLUENZA A VIRUS A/SINGAPORE/INFIMH-16-0019/2016 IVR-186 (H3N2) ANTIGEN (FORMALDEHYDE INACTIVATED), INFLUENZA B VIRUS B/PHUKET/3073/2013 ANTIGEN (FORMALDEHYDE INACTIVATED), AND INFLUENZA B VIRUS B/MARYLAND/15/2016 BX-69A ANTIGEN (FORMALDEHYDE INACTIVATED) 0.5 ML: 15; 15; 15; 15 INJECTION, SUSPENSION INTRAMUSCULAR at 12:55

## 2018-11-30 ASSESSMENT — PAIN SCALES - GENERAL
PAINLEVEL_OUTOF10: 0
PAINLEVEL_OUTOF10: 0

## 2018-11-30 NOTE — PROGRESS NOTES
"2 RN skin check done with ROSA Rodríguez. No s/s of breakdown noted. Bilateral upper back and right flank with small erythematous bumps, some irregular in shape. Patient denies pruritis or discomfort. Reports presence for \"awhile\", and believes to be cystic acne.   "

## 2018-11-30 NOTE — PROGRESS NOTES
Report received over telephone from Trista ED RN. Patient to transfer to [facility], room 312-2.

## 2018-11-30 NOTE — ED NOTES
IV established. Blood sent to lab.  ERP at bedside. Pt mother asking if the doctor can run a lyme disease test d/t pt fatigue. Pt and mother informed that will be up to the physician but initially we are just running basic protocol labs.

## 2018-11-30 NOTE — CONSULTS
Cardiology Consult Note    Date of note:    11/30/2018      Patient ID:    Name:             Rosalba Leavitt   YOB: 1995  Age:                 23 y.o.  female   MRN:               [id number]                                                             Consulting Physician: Yanet Agee MD    Consult question: Evaluation of elevated troponin    History of Present Illness:      Rosalba Leavitt is a 23-year-old woman with no significant past medical history apart from nicotine dependence who comes in with epigastric abdominal discomfort in the setting of what appears to be a viral gastroenteritis.  She had a troponin drawn that was initially elevated possibly in error related to which cardiology is consulted.    She tells me about her pressure-like midsternal chest discomfort in association with her nausea and fever.  The chest discomfort has been present for about 3 days only with her other symptoms.  She says that it lasts for perhaps a few minutes at a time and seems to be worse when she stands up, but she can identify no other palliating or provoking factors including food and position.  She goes on to tell me about exercising on a treadmill over the summer about an hour at a time it she thinks 4-5 mph on level ground with no similar chest discomfort.    She does not know her family history, and is smoked about 1/4 pack/day since the age of 16 now down to 1 cigarette/day she says with plans to quit.    Review of Systems:  All others reviewed and negative.    Past Medical History:   Past Medical History:   Diagnosis Date   • Anxiety    • Depression      Active Hospital Problems    Diagnosis   • Enteritis [K52.9]   • Chest pain [R07.9]   • Adjustment reaction with anxiety and depression [F43.23]       Past Surgical History:  History reviewed. No pertinent surgical history.    Hospital Medications:    Current Facility-Administered Medications:   •  clonazePAM (KLONOPIN) tablet 1 mg, 1 mg,  Oral, Q EVENING, Irene Kirk M.D., 1 mg at 11/30/18 0004  •  enoxaparin (LOVENOX) inj 40 mg, 40 mg, Subcutaneous, DAILY, Irene Kirk M.D., 40 mg at 11/30/18 1006  •  ondansetron (ZOFRAN) syringe/vial injection 4 mg, 4 mg, Intravenous, Q4HRS PRN, Irene Kirk M.D.  •  ondansetron (ZOFRAN ODT) dispertab 4 mg, 4 mg, Oral, Q4HRS PRN, Irene Kirk M.D.  •  promethazine (PHENERGAN) tablet 12.5-25 mg, 12.5-25 mg, Oral, Q4HRS PRN, Irene Kirk M.D.  •  promethazine (PHENERGAN) suppository 12.5-25 mg, 12.5-25 mg, Rectal, Q4HRS PRN, Irene Kirk M.D.  •  prochlorperazine (COMPAZINE) injection 5-10 mg, 5-10 mg, Intravenous, Q4HRS PRN, Irene Kirk M.D.  •  acetaminophen (TYLENOL) tablet 650 mg, 650 mg, Oral, Q6HRS PRN, Irene Kirk M.D.    Current Outpatient Medications:  Prescriptions Prior to Admission   Medication Sig Dispense Refill Last Dose   • ondansetron (ZOFRAN ODT) 4 MG TABLET DISPERSIBLE Take 4 mg by mouth every 6 hours as needed for Nausea.   11/29/2018 at 1200   • diphenoxylate-atropine (LOMOTIL) 2.5-0.025 MG Tab Take 2 Tabs by mouth every 6 hours as needed for Diarrhea.   11/29/2018 at 1200   • Chlorphen-Pseudoephed-APAP (THERAFLU FLU/COLD PO) Take 1 Package by mouth 3 times a day as needed (flu symptoms).   11/27/2018 at Unknown       Medication Allergy:  No Known Allergies    Family History:  Family History   Problem Relation Age of Onset   • Adopted: Yes       Social History:  Social History     Social History   • Marital status: Single     Spouse name: N/A   • Number of children: N/A   • Years of education: N/A     Occupational History   • Not on file.     Social History Main Topics   • Smoking status: Current Every Day Smoker     Packs/day: 0.10     Types: Cigarettes   • Smokeless tobacco: Never Used   • Alcohol use No   • Drug use: No   • Sexual activity: No     Other Topics Concern   • Not on file     Social History Narrative   • No  "narrative on file         Physical Exam:   Vitals/   Weight/BMI: Body mass index is 26.75 kg/m².  Blood pressure 119/74, pulse 93, temperature 37.1 °C (98.8 °F), temperature source Oral, resp. rate 16, height 1.6 m (5' 3\"), weight 68.5 kg (151 lb 0.2 oz), last menstrual period 11/08/2018, SpO2 95 %, not currently breastfeeding.  Vitals:    11/29/18 2224 11/29/18 2323 11/30/18 0500 11/30/18 0708   BP:  129/86 115/64 119/74   Pulse: 77 92 93 93   Resp: (!) 21 14 14 16   Temp:  36.6 °C (97.8 °F) 36.8 °C (98.3 °F) 37.1 °C (98.8 °F)   TempSrc:  Oral Oral Oral   SpO2: 95% 95% 94% 95%   Weight:       Height:  1.6 m (5' 3\")       Oxygen Therapy:  Pulse Oximetry: 95 %, O2 Delivery: None (Room Air)    Physical Exam   Constitutional: She is oriented to person, place, and time. She appears well-developed and well-nourished. No distress.   Pleasant, in no distress   HENT:   Head: Normocephalic and atraumatic.   Eyes: Pupils are equal, round, and reactive to light. Conjunctivae and EOM are normal.   Neck: No JVD present.   Cardiovascular: Normal rate, regular rhythm and normal heart sounds.  Exam reveals no gallop and no friction rub.    No murmur heard.  Pulmonary/Chest: Effort normal and breath sounds normal. No respiratory distress. She has no wheezes. She has no rales.   Abdominal: Soft. Bowel sounds are normal. She exhibits no distension.   Musculoskeletal: She exhibits no edema (no lower extremity edema bilaterally).   Neurological: She is alert and oriented to person, place, and time.   Skin: Skin is warm and dry. No rash noted. She is not diaphoretic. No erythema. No pallor.   Psychiatric: She has a normal mood and affect. Her behavior is normal. Judgment and thought content normal.   Nursing note and vitals reviewed.      MDM (Data Review):     Records reviewed and summarized in current documentation    Lab Data Review:  Recent Results (from the past 24 hour(s))   HCG QUAL SERUM    Collection Time: 11/29/18  3:30 PM "   Result Value Ref Range    Beta-Hcg Qualitative Serum Negative Negative   URINALYSIS,CULTURE IF INDICATED    Collection Time: 11/29/18  3:56 PM   Result Value Ref Range    Color Yellow     Character Clear     Specific Gravity 1.010 <1.035    Ph 7.0 5.0 - 8.0    Glucose Negative Negative mg/dL    Ketones Negative Negative mg/dL    Protein Negative Negative mg/dL    Bilirubin Negative Negative    Nitrite Negative Negative    Leukocyte Esterase Negative Negative    Occult Blood Negative Negative    Micro Urine Req see below    CBC WITH DIFFERENTIAL    Collection Time: 11/29/18  4:30 PM   Result Value Ref Range    WBC 8.5 4.8 - 10.8 K/uL    RBC 4.79 4.20 - 5.40 M/uL    Hemoglobin 14.5 12.0 - 16.0 g/dL    Hematocrit 42.6 37.0 - 47.0 %    MCV 88.9 81.4 - 97.8 fL    MCH 30.3 27.0 - 33.0 pg    MCHC 34.0 33.6 - 35.0 g/dL    RDW 39.5 35.9 - 50.0 fL    Platelet Count 355 164 - 446 K/uL    MPV 10.7 9.0 - 12.9 fL    Neutrophils-Polys 56.30 44.00 - 72.00 %    Lymphocytes 36.50 22.00 - 41.00 %    Monocytes 4.80 0.00 - 13.40 %    Eosinophils 1.60 0.00 - 6.90 %    Basophils 0.70 0.00 - 1.80 %    Immature Granulocytes 0.10 0.00 - 0.90 %    Nucleated RBC 0.00 /100 WBC    Neutrophils (Absolute) 4.80 2.00 - 7.15 K/uL    Lymphs (Absolute) 3.12 1.00 - 4.80 K/uL    Monos (Absolute) 0.41 0.00 - 0.85 K/uL    Eos (Absolute) 0.14 0.00 - 0.51 K/uL    Baso (Absolute) 0.06 0.00 - 0.12 K/uL    Immature Granulocytes (abs) 0.01 0.00 - 0.11 K/uL    NRBC (Absolute) 0.00 K/uL   COMP METABOLIC PANEL    Collection Time: 11/29/18  4:30 PM   Result Value Ref Range    Sodium 133 (L) 135 - 145 mmol/L    Potassium 3.6 3.6 - 5.5 mmol/L    Chloride 104 96 - 112 mmol/L    Co2 22 20 - 33 mmol/L    Anion Gap 7.0 0.0 - 11.9    Glucose 103 (H) 65 - 99 mg/dL    Bun 6 (L) 8 - 22 mg/dL    Creatinine 0.74 0.50 - 1.40 mg/dL    Calcium 9.0 8.4 - 10.2 mg/dL    AST(SGOT) 21 12 - 45 U/L    ALT(SGPT) 19 2 - 50 U/L    Alkaline Phosphatase 56 30 - 99 U/L    Total Bilirubin 0.3  0.1 - 1.5 mg/dL    Albumin 3.6 3.2 - 4.9 g/dL    Total Protein 7.2 6.0 - 8.2 g/dL    Globulin 3.6 (H) 1.9 - 3.5 g/dL    A-G Ratio 1.0 g/dL   LIPASE    Collection Time: 18  4:30 PM   Result Value Ref Range    Lipase 24 7 - 58 U/L   TROPONIN    Collection Time: 18  4:30 PM   Result Value Ref Range    Troponin I 0.41 (H) 0.00 - 0.04 ng/mL   ESTIMATED GFR    Collection Time: 18  4:30 PM   Result Value Ref Range    GFR If African American >60 >60 mL/min/1.73 m 2    GFR If Non African American >60 >60 mL/min/1.73 m 2   WESTERGREN SED RATE    Collection Time: 18  4:30 PM   Result Value Ref Range    Sed Rate Westergren 20 0 - 20 mm/hour   CRP QUANTITIVE (NON-CARDIAC)    Collection Time: 18  4:30 PM   Result Value Ref Range    Stat C-Reactive Protein 1.44 (H) 0.00 - 0.75 mg/dL   EKG (NOW)    Collection Time: 18  4:54 PM   Result Value Ref Range    Report       [facility] Emergency Dept.    Test Date:  2018  Pt Name:    JANELLE LAU                   Department[facility]  MRN:        [id number]                      [facility]   Gender:     Female                       Technician: MARIAN  :        1995                   Requested By:TIM WANG  Order #:    [id number]                    Reading MD: TIM WANG MD    Measurements  Intervals                                Axis  Rate:       90                           P:          -12  KS:         152                          QRS:        30  QRSD:       78                           T:          7  QT:         344  QTc:        421    Interpretive Statements  SINUS RHYTHM  No previous ECG available for comparison    Electronically Signed On 2018 17:44:58 PST by TIM WANG MD     UR DRUG SCREEN([facility])    Collection Time: 18  8:08 PM   Result Value Ref Range    Phencyclidine -Pcp Negative Negative    Benzodiazepines Negative Negative    Cocaine Metabolite Negative Negative     Amphetamines By Triage Negative Negative    Urine THC Negative Negative    Codeine-Morphine Positive (A) Negative    Barbiturates Negative Negative   TROPONIN    Collection Time: 11/29/18 10:45 PM   Result Value Ref Range    Troponin I <0.02 0.00 - 0.04 ng/mL   Lipid Profile (Lipid Panel) Fasting    Collection Time: 11/30/18  2:45 AM   Result Value Ref Range    Cholesterol,Tot 189 100 - 199 mg/dL    Triglycerides 133 0 - 149 mg/dL    HDL 53 >=40 mg/dL     (H) <100 mg/dL   TROPONIN    Collection Time: 11/30/18  2:48 AM   Result Value Ref Range    Troponin I <0.02 0.00 - 0.04 ng/mL       Imaging/Procedures Review (all reviewed and pertinent for):      EKG (11/29/2018 at 4:54 PM, tracings and report reviewed, my interpretation): M and straight sinus rhythm with no ischemic changes    Echocardiogram, is pending at the time of this note     CT chest, 11/29/2018, images and report reviewed, my interpretation: Is significant for absence of coronary calcifications.  There was no pulmonary embolism found either.      Impression and Recommendations:       1.  Troponin elevation: Clinical scenarios not consistent with myocardial infarction.  Provided that her echocardiogram demonstrates normal systolic function I would recommend no additional testing at this time.  I did discuss with her that coronary calcification is 1 of the most sensitive findings for obstructive coronary disease, which I have very little clinical suspicion for in her case.    2.  Chest discomfort: Consistent with esophagitis or gastritis.  As above.    Again, from my perspective she is stable for discharge if her echocardiogram does not show systolic dysfunction.    Case was discussed with the attending physician, Yanet Agee MD.    Vinay Wang MD  Cardiologist, [facility] [place]

## 2018-11-30 NOTE — ED PROVIDER NOTES
ED Provider Note    CHIEF COMPLAINT  Chief Complaint   Patient presents with   • Dizziness     x 4 days   • N/V   • Abdominal Pain     x 4 days       HPI  Rosalba Leavitt is a 23 y.o. female who presents with abdominal pain and dizziness.  It started 1 day prior to presentation.  To the epigastrium and bilateral upper quadrant right greater than left.  She also then developed a chest pain episode of left-sided head pain.  She has had in the past but not this bad.  She is not sure what it was.  She went to [facility] ER got fluid yesterday pain is worse today she comes into the ER for evaluation.  She has nausea but no vomiting she has diarrhea but no melena no dysuria no abnormal vaginal pain no fever no chills no abdominal surgeries smoker occasional alcohol no illicit drugs all other systems are negative    REVIEW OF SYSTEMS  See HPI for further details    PAST MEDICAL HISTORY  Past Medical History:   Diagnosis Date   • Anxiety    • Depression        FAMILY HISTORY  Family History   Problem Relation Age of Onset   • Adopted: Yes       SOCIAL HISTORY  Social History     Social History   • Marital status: Single     Spouse name: N/A   • Number of children: N/A   • Years of education: N/A     Social History Main Topics   • Smoking status: Current Every Day Smoker     Packs/day: 0.10     Types: Cigarettes   • Smokeless tobacco: Never Used   • Alcohol use No   • Drug use: No   • Sexual activity: No     Other Topics Concern   • Not on file     Social History Narrative   • No narrative on file       SURGICAL HISTORY  History reviewed. No pertinent surgical history.    CURRENT MEDICATIONS  Home Medications     Reviewed by Rosalie Jefferson (Pharmacy Tech) on 11/29/18 at 1621  Med List Status: Complete   Medication Last Dose Status   Chlorphen-Pseudoephed-APAP (THERAFLU FLU/COLD PO) 11/27/2018 Active   diphenoxylate-atropine (LOMOTIL) 2.5-0.025 MG Tab 11/29/2018 Active   ondansetron (ZOFRAN ODT) 4 MG TABLET  "DISPERSIBLE 11/29/2018 Active                ALLERGIES  No Known Allergies    PHYSICAL EXAM  VITAL SIGNS: /96   Pulse 90   Temp 36.4 °C (97.6 °F) (Temporal)   Resp 20   Ht 1.6 m (5' 3\")   Wt 68.5 kg (151 lb 0.2 oz)   LMP 11/08/2018   SpO2 95%   BMI 26.75 kg/m²     Constitutional: Patient is alert and oriented x3 in mild to moderate distress   HENT: Moist mucous membranes  Eyes:   No conjunctivitis or icterus  Neck: Trach is midline about thyroid  Lymphatic: Negative anterior cervical lymphadenopathy  Cardiovascular: Normal heart rate    Thorax & Lungs: Clear to auscultation  Back: No CVA tenderness  Abdomen: Obese abdomen epigastric right upper quadrant tenderness noted on palpation  Neurologic: Normal motor sensation  Extremities: No edema  Psychiatric: Affect normal, Judgment normal, Mood normal.     Results for orders placed or performed during the hospital encounter of 11/29/18   CBC WITH DIFFERENTIAL   Result Value Ref Range    WBC 8.5 4.8 - 10.8 K/uL    RBC 4.79 4.20 - 5.40 M/uL    Hemoglobin 14.5 12.0 - 16.0 g/dL    Hematocrit 42.6 37.0 - 47.0 %    MCV 88.9 81.4 - 97.8 fL    MCH 30.3 27.0 - 33.0 pg    MCHC 34.0 33.6 - 35.0 g/dL    RDW 39.5 35.9 - 50.0 fL    Platelet Count 355 164 - 446 K/uL    MPV 10.7 9.0 - 12.9 fL    Neutrophils-Polys 56.30 44.00 - 72.00 %    Lymphocytes 36.50 22.00 - 41.00 %    Monocytes 4.80 0.00 - 13.40 %    Eosinophils 1.60 0.00 - 6.90 %    Basophils 0.70 0.00 - 1.80 %    Immature Granulocytes 0.10 0.00 - 0.90 %    Nucleated RBC 0.00 /100 WBC    Neutrophils (Absolute) 4.80 2.00 - 7.15 K/uL    Lymphs (Absolute) 3.12 1.00 - 4.80 K/uL    Monos (Absolute) 0.41 0.00 - 0.85 K/uL    Eos (Absolute) 0.14 0.00 - 0.51 K/uL    Baso (Absolute) 0.06 0.00 - 0.12 K/uL    Immature Granulocytes (abs) 0.01 0.00 - 0.11 K/uL    NRBC (Absolute) 0.00 K/uL   COMP METABOLIC PANEL   Result Value Ref Range    Sodium 133 (L) 135 - 145 mmol/L    Potassium 3.6 3.6 - 5.5 mmol/L    Chloride 104 96 - 112 " mmol/L    Co2 22 20 - 33 mmol/L    Anion Gap 7.0 0.0 - 11.9    Glucose 103 (H) 65 - 99 mg/dL    Bun 6 (L) 8 - 22 mg/dL    Creatinine 0.74 0.50 - 1.40 mg/dL    Calcium 9.0 8.4 - 10.2 mg/dL    AST(SGOT) 21 12 - 45 U/L    ALT(SGPT) 19 2 - 50 U/L    Alkaline Phosphatase 56 30 - 99 U/L    Total Bilirubin 0.3 0.1 - 1.5 mg/dL    Albumin 3.6 3.2 - 4.9 g/dL    Total Protein 7.2 6.0 - 8.2 g/dL    Globulin 3.6 (H) 1.9 - 3.5 g/dL    A-G Ratio 1.0 g/dL   LIPASE   Result Value Ref Range    Lipase 24 7 - 58 U/L   HCG QUAL SERUM   Result Value Ref Range    Beta-Hcg Qualitative Serum Negative Negative   URINALYSIS,CULTURE IF INDICATED   Result Value Ref Range    Color Yellow     Character Clear     Specific Gravity 1.010 <1.035    Ph 7.0 5.0 - 8.0    Glucose Negative Negative mg/dL    Ketones Negative Negative mg/dL    Protein Negative Negative mg/dL    Bilirubin Negative Negative    Nitrite Negative Negative    Leukocyte Esterase Negative Negative    Occult Blood Negative Negative    Micro Urine Req see below    TROPONIN   Result Value Ref Range    Troponin I 0.41 (H) 0.00 - 0.04 ng/mL   ESTIMATED GFR   Result Value Ref Range    GFR If African American >60 >60 mL/min/1.73 m 2    GFR If Non African American >60 >60 mL/min/1.73 m 2   UR DRUG SCREEN([facility] ONLY)   Result Value Ref Range    Phencyclidine -Pcp Negative Negative    Benzodiazepines Negative Negative    Cocaine Metabolite Negative Negative    Amphetamines By Triage Negative Negative    Urine THC Negative Negative    Codeine-Morphine Positive (A) Negative    Barbiturates Negative Negative   EKG (NOW)   Result Value Ref Range    Report       [facility] Emergency Dept.    Test Date:  2018  Pt Name:    JANELLE LAU                   Department: EDS  MRN:        [id number]                      [facility]  Gender:     Female                       Technician: MARIAN  :        1995                   Requested By:TIM GOODRICH  FELIPE  Order #:    [id number]                    Reading MD: TIM GRANADO MD    Measurements  Intervals                                Axis  Rate:       90                           P:          -12  FL:         152                          QRS:        30  QRSD:       78                           T:          7  QT:         344  QTc:        421    Interpretive Statements  SINUS RHYTHM  No previous ECG available for comparison    Electronically Signed On 11- 17:44:58 PST by TIM GRANADO MD          CT-CTA CHEST PULMONARY ARTERY W/ RECONS   Final Result      No evidence of pulmonary embolus.            DX-CHEST-PORTABLE (1 VIEW)   Final Result      Bibasilar atelectasis.      US-RUQ   Final Result      Unremarkable liver and biliary tree ultrasound.         EC-ECHOCARDIOGRAM COMPLETE W/O CONT    (Results Pending)         EKG: Normal sinus rhythm rate of 90 normal corrected QT interval normal QRS and axis normal EKG no old EKG for comparison    COURSE & MEDICAL DECISION MAKING  Pertinent Labs & Imaging studies reviewed. (See chart for details)  Patient has pain in the epigastric right upper quadrant with some radiation.  Main concern is acid peptic disease, biliary colic, with the chest pain radiation rule out cardiac involvement.  Also pancreatitis hepatitis pregnancy.    Patient's lab work does reveal an elevated troponin with a normal EKG.  She did have an episode of chest pain.  Other labs are unremarkable.  CTA of the chest was obtained after discussing the case with Dr. Wang of cardiology shows no PE that was negative.  Have contacted hospitalist for admission.    FINAL IMPRESSION  1.   1. Chest pain, unspecified type    2. Epigastric pain        2.   3.         Electronically signed by: Tim Granado, 11/29/2018 4:46 PM

## 2018-11-30 NOTE — PROGRESS NOTES
Tele strip at 2335 shows SR w/ HR of 81    IA 0.14  QRS 0.08  QT 0.34    Telemetry Summary    Rhythm NSR, Asymptomatic ST  Rate 70s - low 100s  Ectopy None    Telemetry monitoring strips placed in patient's chart.

## 2018-11-30 NOTE — ASSESSMENT & PLAN NOTE
With elevated troponin, trending troponin, checking echo  Check CRP and sed rate  CTA negative for PE

## 2018-11-30 NOTE — ED NOTES
Pt instructed to have someone to drive home after narcotic administration. Pt agreed on not driving after receiving narcotics. Pt medicated as directed by ERP.  Pt off the floor to imaging.

## 2018-11-30 NOTE — PROGRESS NOTES
Admission completed with patient.   VSS and afebrile. SpO2 WNL on RA. Denies any chest pain, pressure or palpitations presently. LS CTA bilaterally, denies SOB or PILLAI. Reporting mild nausea. Given saltine crackers and ginger ale per patient request. Abdomen soft/nontender. LBM 11/28. Plan of care discussed and questions answered. Troponins q6h with ECHO in AM. No further needs/concerns per patient. Will continue to monitor.

## 2018-11-30 NOTE — DISCHARGE INSTRUCTIONS
Discharge Instructions    Discharged to home by car with relative. Discharged via walking, hospital escort: Refused.  Special equipment needed: Not Applicable    Be sure to schedule a follow-up appointment with your primary care doctor or any specialists as instructed.     Discharge Plan:   Diet Plan: Discussed  Activity Level: Discussed  Confirmed Follow up Appointment: Patient to Call and Schedule Appointment  Confirmed Symptoms Management: Discussed  Medication Reconciliation Updated: Yes  Influenza Vaccine Indication: Indicated: 9 to 64 years of age  Influenza Vaccine Given - only chart on this line when given: Influenza Vaccine Given (See MAR)    I understand that a diet low in cholesterol, fat, and sodium is recommended for good health. Unless I have been given specific instructions below for another diet, I accept this instruction as my diet prescription.   Other diet: Regular    Special Instructions: None    · Is patient discharged on Warfarin / Coumadin?   No     Depression / Suicide Risk    As you are discharged from this [facility] facility, it is important to learn how to keep safe from harming yourself.    Recognize the warning signs:  · Abrupt changes in personality, positive or negative- including increase in energy   · Giving away possessions  · Change in eating patterns- significant weight changes-  positive or negative  · Change in sleeping patterns- unable to sleep or sleeping all the time   · Unwillingness or inability to communicate  · Depression  · Unusual sadness, discouragement and loneliness  · Talk of wanting to die  · Neglect of personal appearance   · Rebelliousness- reckless behavior  · Withdrawal from people/activities they love  · Confusion- inability to concentrate     If you or a loved one observes any of these behaviors or has concerns about self-harm, here's what you can do:  · Talk about it- your feelings and reasons for harming yourself  · Remove any means that you might use  to hurt yourself (examples: pills, rope, extension cords, firearm)  · Get professional help from the community (Mental Health, Substance Abuse, psychological counseling)  · Do not be alone:Call your Safe Contact- someone whom you trust who will be there for you.  · Call your local CRISIS HOTLINE 186-2527 or 860-783-7865  · Call your local Children's Mobile Crisis Response Team [place] (489) 276-6153 or www.[employer]  · Call the toll free National Suicide Prevention Hotlines   · National Suicide Prevention Lifeline 056-322-PPHD (2294)  · National Hope Line Network 800-SUICIDE (948-4555)

## 2018-11-30 NOTE — H&P
Hospital Medicine History & Physical Note    Date of Service  11/29/2018    Primary Care Physician  Freida Strange M.D.    Consultants  Aurelia Wang MD    Code Status  Full    Chief Complaint  Chest pain    History of Presenting Illness  23 y.o. female with history of depression and anxiety who presented 11/29/2018 with 4 days of enteritis symptoms, she started with nausea vomiting and diarrhea with low-grade fever.  After the first day thethe fever resolved but she continued to feel poorly and continued to have nausea and vomiting.  She was seen at [facility] yesterday and given medication for the diarrhea which has improved, but she has been afraid to eat.   She complains of epigastric pain but also has a pressure sensation in her midsternal area, it is somewhat worse with stress and with exertion but nothing really seems to make it better.  At its worst it is a 2-3 out of 10, is not really accompanied by shortness of breath and her nausea seems to be separate entity from this.  Her EKG is unremarkable but curiously her troponin came back positive at 0.41.  CTA was negative for PE.  She will be brought in to trend troponins check an echo.  Cardiology was consulted by the ER physician    Review of Systems  Review of Systems   Constitutional: Positive for fever (Resolved). Negative for chills and malaise/fatigue.   HENT: Negative for congestion and sore throat.    Respiratory: Negative for cough and shortness of breath.    Cardiovascular: Positive for chest pain. Negative for palpitations.   Gastrointestinal: Positive for abdominal pain (Improved), diarrhea (Resolved), nausea (Improved) and vomiting (Possibly resolved).   Genitourinary: Negative for dysuria and urgency.   Musculoskeletal: Negative for back pain.   Skin: Negative for itching and rash.   Neurological: Negative for dizziness, weakness and headaches.   Psychiatric/Behavioral: Negative for substance abuse and suicidal ideas (Resolved). The  patient is nervous/anxious and has insomnia.        Past Medical History   has a past medical history of Anxiety and Depression. She also has no past medical history of Anemia; Arthritis; GERD (gastroesophageal reflux disease); or Urinary tract infection, site not specified.  She had prior suicidal ideation but denies this currently she was recently started on Klonopin    Surgical History   has no past surgical history on file.  She confirms she has had no surgery  Family History  family history is not on file. She was adopted.  She is aware that her birth mother was diabetic but knows nothing else about her family history    Social History   reports that she has been smoking Cigarettes.  She has been smoking about 0.10 packs per day. She has never used smokeless tobacco. She reports that she does not drink alcohol or use drugs.  She works as a  at [employer], she lives with her mother.  She has no children    Allergies  No Known Allergies    Medications  Prior to Admission Medications   Prescriptions Last Dose Informant Patient Reported? Taking?   Chlorphen-Pseudoephed-APAP (THERAFLU FLU/COLD PO) 11/27/2018 at Unknown Patient Yes Yes   Sig: Take 1 Package by mouth 3 times a day as needed (flu symptoms).   diphenoxylate-atropine (LOMOTIL) 2.5-0.025 MG Tab 11/29/2018 at 1200 Patient Yes Yes   Sig: Take 2 Tabs by mouth every 6 hours as needed for Diarrhea.   ondansetron (ZOFRAN ODT) 4 MG TABLET DISPERSIBLE 11/29/2018 at 1200 Patient Yes Yes   Sig: Take 4 mg by mouth every 6 hours as needed for Nausea.      Facility-Administered Medications: None       Physical Exam  Temp:  [36.4 °C (97.6 °F)] 36.4 °C (97.6 °F)  Pulse:  [] 90  Resp:  [14-23] 20  BP: (142)/(96) 142/96    Physical Exam   Constitutional: She is oriented to person, place, and time. She appears well-developed and well-nourished. No distress.   HENT:   Head: Normocephalic and atraumatic.   Right Ear: External ear normal.   Left  Ear: External ear normal.   Nose: Nose normal.   Mouth/Throat: Oropharynx is clear and moist. No oropharyngeal exudate.   Eyes: Pupils are equal, round, and reactive to light. Conjunctivae and EOM are normal. Right eye exhibits no discharge. Left eye exhibits no discharge.   Neck: Neck supple.   Cardiovascular: Normal rate and regular rhythm.    Pulmonary/Chest: Effort normal and breath sounds normal.   Abdominal: Soft. Bowel sounds are normal. She exhibits no distension. There is no tenderness.   Musculoskeletal: She exhibits no edema or tenderness.   Neurological: She is alert and oriented to person, place, and time. No cranial nerve deficit.   Skin: Skin is warm and dry. She is not diaphoretic.   Psychiatric: She has a normal mood and affect.   Nursing note and vitals reviewed.      Laboratory:  Recent Labs      11/29/18   1630   WBC  8.5   RBC  4.79   HEMOGLOBIN  14.5   HEMATOCRIT  42.6   MCV  88.9   MCH  30.3   MCHC  34.0   RDW  39.5   PLATELETCT  355   MPV  10.7     Recent Labs      11/29/18   1630   SODIUM  133*   POTASSIUM  3.6   CHLORIDE  104   CO2  22   GLUCOSE  103*   BUN  6*   CREATININE  0.74   CALCIUM  9.0     Recent Labs      11/29/18   1630   ALTSGPT  19   ASTSGOT  21   ALKPHOSPHAT  56   TBILIRUBIN  0.3   LIPASE  24   GLUCOSE  103*                 Recent Labs      11/29/18   1630   TROPONINI  0.41*       Urinalysis:    Recent Labs      11/29/18   1556   SPECGRAVITY  1.010   GLUCOSEUR  Negative   KETONES  Negative   NITRITE  Negative   LEUKESTERAS  Negative        Imaging:  CT-CTA CHEST PULMONARY ARTERY W/ RECONS   Final Result      No evidence of pulmonary embolus.            DX-CHEST-PORTABLE (1 VIEW)   Final Result      Bibasilar atelectasis.      US-RUQ   Final Result      Unremarkable liver and biliary tree ultrasound.         EC-ECHOCARDIOGRAM COMPLETE W/O CONT    (Results Pending)         Assessment/Plan:  I anticipate this patient is appropriate for observation status at this time.    Chest  pain   Assessment & Plan    With elevated troponin, trending troponin, checking echo  Check CRP and sed rate  CTA negative for PE     Enteritis   Assessment & Plan    Symptoms are improving, it diet as tolerated  As needed nausea medications         VTE prophylaxis: Lovenox

## 2018-11-30 NOTE — DISCHARGE SUMMARY
Discharge Summary    CHIEF COMPLAINT ON ADMISSION  Chief Complaint   Patient presents with   • Dizziness     x 4 days   • N/V   • Abdominal Pain     x 4 days       Reason for Admission  Headache,Dizziness, Vomiting     Admission Date  11/29/2018    CODE STATUS  Full Code    HPI & HOSPITAL COURSE  This is a 23 y.o. female here with headache dizziness and vomiting.  She said with the vomiting and retching she did have a little chest pain.  She was evaluated with a troponin which happened to be elevated at 0.41.  Cardiology was consulted.  Echocardiogram was done which was within normal limits.  All repeat troponins were less than 0.02. Discussed with cardiology Dr. Vinay Wang who recommended discharge and follow up in cardiology clinic. Her vomiting resolved and she was eating well after fluids and anti emetics.       Therefore, she is discharged in good and stable condition to home with close outpatient follow-up.        Discharge Date  11/30/18    FOLLOW UP ITEMS POST DISCHARGE  cardiology    DISCHARGE DIAGNOSES  Active Problems:    Enteritis POA: Unknown    Chest pain POA: Unknown    Adjustment reaction with anxiety and depression POA: Unknown  Resolved Problems:    * No resolved hospital problems. *      FOLLOW UP  No future appointments.  Freida Strange M.D.  [address] [facility] [address]  [phone]      Please call to schedule your follow up appointment, thank you       MEDICATIONS ON DISCHARGE     Medication List      CONTINUE taking these medications      Instructions   diphenoxylate-atropine 2.5-0.025 MG Tabs  Commonly known as:  LOMOTIL   Take 2 Tabs by mouth every 6 hours as needed for Diarrhea.  Dose:  2 Tab     ondansetron 4 MG Tbdp  Commonly known as:  ZOFRAN ODT   Take 4 mg by mouth every 6 hours as needed for Nausea.  Dose:  4 mg     THERAFLU FLU/COLD PO   Take 1 Package by mouth 3 times a day as needed (flu symptoms).  Dose:  1 Package            Allergies  No Known  Allergies    DIET  Orders Placed This Encounter   Procedures   • Diet Order Regular     Standing Status:   Standing     Number of Occurrences:   1     Order Specific Question:   Diet:     Answer:   Regular [1]       ACTIVITY  As tolerated.  Weight bearing as tolerated    CONSULTATIONS  Cardiology, Dr. Vinay Wang    PROCEDURES  none    LABORATORY  Lab Results   Component Value Date    SODIUM 133 (L) 11/29/2018    POTASSIUM 3.6 11/29/2018    CHLORIDE 104 11/29/2018    CO2 22 11/29/2018    GLUCOSE 103 (H) 11/29/2018    BUN 6 (L) 11/29/2018    CREATININE 0.74 11/29/2018        Lab Results   Component Value Date    WBC 8.5 11/29/2018    HEMOGLOBIN 14.5 11/29/2018    HEMATOCRIT 42.6 11/29/2018    PLATELETCT 355 11/29/2018

## 2018-11-30 NOTE — CARE PLAN
Problem: Safety  Goal: Will remain free from falls  Outcome: PROGRESSING AS EXPECTED  No falls this shift. Denies dizziness or feeling lightheaded. Calls appropriately if assistance needed; otherwise up   independently with steady gait.Falls precautions in place: bed in lowest and locked position, side rails raised x 2,   room near nurses station, call light within reach, nonslip socks on, purposeful hourly rounding.       Problem: Knowledge Deficit  Goal: Knowledge of disease process/condition, treatment plan, diagnostic tests, and medications will improve  Outcome: PROGRESSING AS EXPECTED  Plan of care discussed including diagnostics (AM ECHO), lab work (troponin q6h, fasting lipid profile), and medications.   Handout given regarding Lovenox injections and Clonazepam, which patient reports is a new prescription for anxiety,   but has not yet taken at home. Questions answered.     Problem: Hemodynamic Status  Goal: Stable Vital Signs and Fluid Balance  Outcome: PROGRESSING AS EXPECTED  Denies any chest pain, pressure or palpitations. HR NSR on telemetry. BP normotensive. Afebrile. SpO2 WNL on RA, denies SOB or PILLAI.  Troponin 0.41 upon admission, and < 0.02 with reassessment. Ordered q6h. ECHO pending for AM additionally.

## 2018-11-30 NOTE — PROGRESS NOTES
Patient arrived to unit from ED. Ambulated independently from stretcher to bed with steady gait. Denies dizziness or feeling lightheaded. No family present at bedside.

## 2018-11-30 NOTE — ED NOTES
Med rec complete per pt at bedside  Ok per Pt to discuss medications with visitor/s present  Allergies have been verified   No ABX within the last 30 days

## 2018-12-01 NOTE — PROGRESS NOTES
Telemetry Shift Summary    Rhythm SR/ST  HR Range 78-96  Ectopy none  Measurements 0.14/0.08/0.36        Normal Values  Rhythm SR  HR Range    Measurements 0.12-0.20 / 0.06-0.10  / 0.30-0.52

## 2020-03-17 ENCOUNTER — HOSPITAL ENCOUNTER (EMERGENCY)
Facility: MEDICAL CENTER | Age: 25
End: 2020-03-17
Attending: EMERGENCY MEDICINE
Payer: MEDICAID

## 2020-03-17 ENCOUNTER — APPOINTMENT (OUTPATIENT)
Dept: RADIOLOGY | Facility: MEDICAL CENTER | Age: 25
End: 2020-03-17
Attending: EMERGENCY MEDICINE
Payer: MEDICAID

## 2020-03-17 VITALS
DIASTOLIC BLOOD PRESSURE: 100 MMHG | OXYGEN SATURATION: 95 % | HEIGHT: 63 IN | RESPIRATION RATE: 20 BRPM | BODY MASS INDEX: 37.3 KG/M2 | SYSTOLIC BLOOD PRESSURE: 145 MMHG | WEIGHT: 210.54 LBS | TEMPERATURE: 98.8 F | HEART RATE: 107 BPM

## 2020-03-17 DIAGNOSIS — Z86.59 HISTORY OF POSTTRAUMATIC STRESS DISORDER (PTSD): ICD-10-CM

## 2020-03-17 DIAGNOSIS — R07.9 CHEST PAIN, UNSPECIFIED TYPE: ICD-10-CM

## 2020-03-17 DIAGNOSIS — J06.9 UPPER RESPIRATORY TRACT INFECTION, UNSPECIFIED TYPE: ICD-10-CM

## 2020-03-17 LAB
ALBUMIN SERPL BCP-MCNC: 4.1 G/DL (ref 3.2–4.9)
ALBUMIN/GLOB SERPL: 1.5 G/DL
ALP SERPL-CCNC: 67 U/L (ref 30–99)
ALT SERPL-CCNC: 11 U/L (ref 2–50)
ANION GAP SERPL CALC-SCNC: 15 MMOL/L (ref 7–16)
AST SERPL-CCNC: 15 U/L (ref 12–45)
BASOPHILS # BLD AUTO: 0.6 % (ref 0–1.8)
BASOPHILS # BLD: 0.05 K/UL (ref 0–0.12)
BILIRUB SERPL-MCNC: 0.3 MG/DL (ref 0.1–1.5)
BUN SERPL-MCNC: 7 MG/DL (ref 8–22)
C PNEUM DNA SPEC QL NAA+PROBE: NOT DETECTED
CALCIUM SERPL-MCNC: 9.4 MG/DL (ref 8.4–10.2)
CHLORIDE SERPL-SCNC: 102 MMOL/L (ref 96–112)
CO2 SERPL-SCNC: 21 MMOL/L (ref 20–33)
CREAT SERPL-MCNC: 0.57 MG/DL (ref 0.5–1.4)
D DIMER PPP IA.FEU-MCNC: <0.4 UG/ML (FEU) (ref 0–0.5)
EKG IMPRESSION: NORMAL
EOSINOPHIL # BLD AUTO: 0.1 K/UL (ref 0–0.51)
EOSINOPHIL NFR BLD: 1.2 % (ref 0–6.9)
ERYTHROCYTE [DISTWIDTH] IN BLOOD BY AUTOMATED COUNT: 38.8 FL (ref 35.9–50)
FLUAV H1 2009 PAND RNA SPEC QL NAA+PROBE: NOT DETECTED
FLUAV H1 RNA SPEC QL NAA+PROBE: NOT DETECTED
FLUAV H3 RNA SPEC QL NAA+PROBE: NOT DETECTED
FLUAV RNA SPEC QL NAA+PROBE: NEGATIVE
FLUAV RNA SPEC QL NAA+PROBE: NOT DETECTED
FLUBV RNA SPEC QL NAA+PROBE: NEGATIVE
FLUBV RNA SPEC QL NAA+PROBE: NOT DETECTED
GLOBULIN SER CALC-MCNC: 2.7 G/DL (ref 1.9–3.5)
GLUCOSE SERPL-MCNC: 106 MG/DL (ref 65–99)
HADV DNA SPEC QL NAA+PROBE: NOT DETECTED
HCOV RNA SPEC QL NAA+PROBE: NOT DETECTED
HCT VFR BLD AUTO: 42.8 % (ref 37–47)
HGB BLD-MCNC: 14.8 G/DL (ref 12–16)
HMPV RNA SPEC QL NAA+PROBE: NOT DETECTED
HPIV1 RNA SPEC QL NAA+PROBE: NOT DETECTED
HPIV2 RNA SPEC QL NAA+PROBE: NOT DETECTED
HPIV3 RNA SPEC QL NAA+PROBE: NOT DETECTED
HPIV4 RNA SPEC QL NAA+PROBE: NOT DETECTED
IMM GRANULOCYTES # BLD AUTO: 0.01 K/UL (ref 0–0.11)
IMM GRANULOCYTES NFR BLD AUTO: 0.1 % (ref 0–0.9)
LYMPHOCYTES # BLD AUTO: 3.05 K/UL (ref 1–4.8)
LYMPHOCYTES NFR BLD: 37.2 % (ref 22–41)
M PNEUMO DNA SPEC QL NAA+PROBE: NOT DETECTED
MCH RBC QN AUTO: 31.3 PG (ref 27–33)
MCHC RBC AUTO-ENTMCNC: 34.6 G/DL (ref 33.6–35)
MCV RBC AUTO: 90.5 FL (ref 81.4–97.8)
MONOCYTES # BLD AUTO: 0.66 K/UL (ref 0–0.85)
MONOCYTES NFR BLD AUTO: 8 % (ref 0–13.4)
NEUTROPHILS # BLD AUTO: 4.33 K/UL (ref 2–7.15)
NEUTROPHILS NFR BLD: 52.9 % (ref 44–72)
NRBC # BLD AUTO: 0 K/UL
NRBC BLD-RTO: 0 /100 WBC
PLATELET # BLD AUTO: 266 K/UL (ref 164–446)
PMV BLD AUTO: 10.6 FL (ref 9–12.9)
POTASSIUM SERPL-SCNC: 4.1 MMOL/L (ref 3.6–5.5)
PROT SERPL-MCNC: 6.8 G/DL (ref 6–8.2)
RBC # BLD AUTO: 4.73 M/UL (ref 4.2–5.4)
RSV A RNA SPEC QL NAA+PROBE: NOT DETECTED
RSV B RNA SPEC QL NAA+PROBE: NOT DETECTED
RV+EV RNA SPEC QL NAA+PROBE: NOT DETECTED
S PYO AG THROAT QL: NORMAL
S PYO DNA SPEC NAA+PROBE: NOT DETECTED
SIGNIFICANT IND 70042: NORMAL
SITE SITE: NORMAL
SODIUM SERPL-SCNC: 138 MMOL/L (ref 135–145)
SOURCE SOURCE: NORMAL
TROPONIN T SERPL-MCNC: <6 NG/L (ref 6–19)
WBC # BLD AUTO: 8.2 K/UL (ref 4.8–10.8)

## 2020-03-17 PROCEDURE — 93005 ELECTROCARDIOGRAM TRACING: CPT | Performed by: EMERGENCY MEDICINE

## 2020-03-17 PROCEDURE — 87633 RESP VIRUS 12-25 TARGETS: CPT

## 2020-03-17 PROCEDURE — 87581 M.PNEUMON DNA AMP PROBE: CPT

## 2020-03-17 PROCEDURE — 84484 ASSAY OF TROPONIN QUANT: CPT

## 2020-03-17 PROCEDURE — 99284 EMERGENCY DEPT VISIT MOD MDM: CPT

## 2020-03-17 PROCEDURE — 80053 COMPREHEN METABOLIC PANEL: CPT

## 2020-03-17 PROCEDURE — 71045 X-RAY EXAM CHEST 1 VIEW: CPT

## 2020-03-17 PROCEDURE — 87880 STREP A ASSAY W/OPTIC: CPT

## 2020-03-17 PROCEDURE — 85025 COMPLETE CBC W/AUTO DIFF WBC: CPT

## 2020-03-17 PROCEDURE — 85379 FIBRIN DEGRADATION QUANT: CPT

## 2020-03-17 PROCEDURE — 87486 CHLMYD PNEUM DNA AMP PROBE: CPT

## 2020-03-17 PROCEDURE — 87651 STREP A DNA AMP PROBE: CPT

## 2020-03-17 PROCEDURE — U0002 COVID-19 LAB TEST NON-CDC: HCPCS

## 2020-03-17 PROCEDURE — 87502 INFLUENZA DNA AMP PROBE: CPT

## 2020-03-17 ASSESSMENT — FIBROSIS 4 INDEX: FIB4 SCORE: 0.31

## 2020-03-17 NOTE — ED TRIAGE NOTES
Came back from [place] 3 days ago, cough, sob, chest pain, fever at home 100f for same amount of time, pt roomed to #5. Charge aware pt screened positive.  No ekg performed in triage d/t criteria.

## 2020-03-17 NOTE — ED NOTES
Dc instructions went over with patient. Questions concern addressed. Self isolation packet given to patient. IV removed. Patient told to wear mask when walking out.

## 2020-03-17 NOTE — ED PROVIDER NOTES
"ED Provider Note    ED Provider Note    Primary care provider: Freida Strange M.D.  Means of arrival: Walk-in  History obtained from: Patient    CHIEF COMPLAINT  Chief Complaint   Patient presents with   • Cough   • Chest Pain   • Shortness of Breath     Seen at 12:39 PM.   HPI  Rosalba Leavitt is a 24 y.o. female who presents to the Emergency Department 5 days after she returned from [facility] with 2 days of pressure that is constant on the left side of her chest worse with exertion and 1 day of a dry nonproductive cough.  The patient notes a temperature at home this morning of 100.  She denies any headache, neck pain, abdominal pain, nausea, vomiting, diarrhea, numbness, weakness, bleeding diathesis or impaired immunity.  No history of OCPs.  No leg pain or leg swelling.  No prior history of DVT or pulmonary embolus.  Patient with history of PTSD and anxiety.  She is on Klonopin and Effexor.    REVIEW OF SYSTEMS  See HPI,   Remainder of ROS negative.     PAST MEDICAL HISTORY   has a past medical history of Anxiety and Depression.    SURGICAL HISTORY  patient denies any surgical history    SOCIAL HISTORY  Social History     Tobacco Use   • Smoking status: Former Smoker     Packs/day: 0.10     Types: Cigarettes   • Smokeless tobacco: Never Used   Substance Use Topics   • Alcohol use: No   • Drug use: No      Social History     Substance and Sexual Activity   Drug Use No       FAMILY HISTORY  Family History   Adopted: Yes       CURRENT MEDICATIONS  Reviewed.  See Encounter Summary.     ALLERGIES  No Known Allergies    PHYSICAL EXAM  VITAL SIGNS: /100   Pulse (!) 107   Temp 37.1 °C (98.8 °F) (Temporal)   Resp 20   Ht 1.6 m (5' 3\")   Wt 95.5 kg (210 lb 8.6 oz)   SpO2 95%   BMI 37.30 kg/m²   Constitutional: Awake, alert in no apparent distress.  HENT: Normocephalic, Bilateral external ears normal. Nose normal.   Eyes: Conjunctiva normal, non-icteric, EOMI.    Thorax & Lungs: Easy unlabored " respirations, Clear to ascultation bilaterally.  Cardiovascular: Regular rate, Regular rhythm, No murmurs, rubs or gallops.  Abdomen:  Soft, nontender, nondistended, normal active bowel sounds.   :    Skin: Visualized skin is  Dry, No erythema, No rash.   Musculoskeletal:   No cyanosis, clubbing or edema.  Neurologic: Alert, Grossly non-focal.   Psychiatric: Normal affect, Normal mood  Lymphatic:  No cervical LAD    EKG   12 lead Interpreted by me  Rhythm:  Normal sinus rhythm   Rate: 91  Axis: normal  Ectopy: none  Conduction: normal  ST Segments: no acute change  T Waves: no acute change  Clinical Impression: Normal EKG without acute changes     RADIOLOGY  DX-CHEST-PORTABLE (1 VIEW)   Final Result      No acute cardiopulmonary findings.            COURSE & MEDICAL DECISION MAKING  Pertinent Labs & Imaging studies reviewed. (See chart for details)    Differential diagnoses include but are not limited to: Nonspecific viral illness most likely, anxiety about recent coronavirus, much less likely influenza, coronavirus infection, pneumonia, sepsis, cardiac ischemia, pulmonary embolism    12:39 PM - Medical record reviewed, no recent ER visits, last admission was November 2018 for chest pain, troponin peaked 0.41 and trended to normal.  No specific etiology for the troponin bump was found.    Decision Making:  This is a 24 y.o. year old female who presents with very atypical chest pain.  The patient reports a history of travel to [place], [place], therefore she is a PUI for coronavirus.  The patient is well-appearing, she does have some PTSD and underlying anxiety, I suspect this is most likely the cause of her presentation today that she is anxious about the possible coronavirus infection.    From a chest pain standpoint, she had a slight tachycardia that improved without treatment, EKG did not show ischemic changes, d-dimer is negative, the patient is low Wells criteria so acute pulmonary embolism has been ruled  out.  She notes pain for several days and has an undetectable troponin, she does not have any cardiac risk factors, heart score is extremely low, I do not suspect ACS at this time.    Chest x-ray shows no evidence of pneumonia or pneumothorax.  I do not suspect aortic dissection.    Labs today are wholly unremarkable.  Influenza negative, respiratory panel pending at this time.  Overall the patient's symptoms appear to be of such a mild etiology that I would not be surprised if her respiratory panel and covid 19 swabs are also negative.  Because she is still a PUI we do recommend self quarantine and airborne precautions.      In the ER all airborne precautions were used throughout her duration of evaluation and treatment.    Discharge Medications:  Discharge Medication List as of 3/17/2020  2:29 PM          The patient was discharged home (see d/c instructions) and parent was told to return immediately for any signs or symptoms listed, or any worsening at all.  The patient's parent verbally agreed to the discharge precautions and follow-up plan which is documented in EPIC.    The patient's blood pressure is elevated today. >120/80. I have referred them to primary care for follow up.       FINAL IMPRESSION  1. Chest pain, unspecified type    2. Upper respiratory tract infection, unspecified type    3. History of posttraumatic stress disorder (PTSD)

## 2020-03-18 NOTE — ED NOTES
"ED Positive Culture Follow-up/Notification Note:    Date: 3/18/2020     Patient seen in the ED on 3/17/2020 for chest pressure and dry nonproductive cough with temp of 100 F at home.  Recently returned from [place], [place].   1. Chest pain, unspecified type    2. Upper respiratory tract infection, unspecified type    3. History of posttraumatic stress disorder (PTSD)       Discharge Medication List as of 3/17/2020  2:29 PM          Allergies: Patient has no known allergies.     Vitals:    03/17/20 1236 03/17/20 1237   BP:  145/100   Pulse:  (!) 107   Resp:  20   Temp:  37.1 °C (98.8 °F)   TempSrc:  Temporal   SpO2:  95%   Weight: 95.5 kg (210 lb 8.6 oz)    Height: 1.6 m (5' 3\")        Final cultures:   Results     Procedure Component Value Units Date/Time    Group A Strep by PCR [[id number]] Collected:  03/17/20 1312    Order Status:  Completed Updated:  03/17/20 1711     Group A Strep by PCR Not Detected    Narrative:       PUI for possible COVID-19. Reflex to RSPPP if negative.    Influenza A/B By PCR (Adult - Flu Only) [[id number]] Collected:  03/17/20 1312    Order Status:  Completed Specimen:  Blood from Nasopharyngeal Updated:  03/17/20 1414     Influenza virus A RNA Negative     Influenza virus B, PCR Negative    Narrative:       PUI for possible COVID-19. Reflex to RSPPP if negative.    RAPID STREP,CULT IF INDICATED [[id number]] Collected:  03/17/20 1312    Order Status:  Completed Specimen:  Throat Updated:  03/17/20 1350     Significant Indicator NEG     Source THRT     Site -     Rapid Strep Screen Negative for Group A streptococcus.  A negative result may be obtained if the specimen is  inadequate or antigen concentration is below the  sensitivity of the test. This negative test will be followed  up with a culture as requested.      Narrative:       PUI for possible COVID-19. Reflex to RSPPP if negative.    Beta Strep Screen (Gp. A) [[id number]] Collected:  03/17/20 1312    Order Status:  Canceled     Group A " Strep by PCR [[id number]] Collected:  03/17/20 0000    Order Status:  Canceled Specimen:  Throat           Plan:   Influenza and Respiratory PCR panel negative. Their case will move forward to COVID-19 coronavirus testing.  They are to continue to self-isolate/self-quarantine until further until testing is completed and further direction is provided to them.      Jo Gregorio, PharmD

## 2020-03-20 LAB
SARS-COV-2 RNA SPEC QL NAA+PROBE: NOT DETECTED
SPECIMEN SOURCE: NORMAL

## 2020-03-21 NOTE — ED NOTES
COVID-19 Test Follow Up  Date 3/20/2020    Results for JANELLE LAU (MRN [id number]) as of 3/20/2020 18:07   Ref. Range 3/17/2020 13:12   SARS-CoV-2 Source Unknown NP Swab   SARS-CoV-2, VANESSA Latest Ref Range: Not Detected  Not Detected       Patient tested negative for COVID-19. Attempted to inform patient of result, but there was no answer. Left a message to call for results. If they return the call will discuss standard precautions. Informed there is no need for further self-quarantine unless otherwise directed by the Health Dept.     Jo Gregorio, PharmD    ADDENDUM: 3/20/2020  Patient returned call and was given the results.

## 2021-03-14 ENCOUNTER — HOSPITAL ENCOUNTER (INPATIENT)
Facility: MEDICAL CENTER | Age: 26
LOS: 1 days | DRG: 101 | End: 2021-03-15
Attending: EMERGENCY MEDICINE | Admitting: INTERNAL MEDICINE
Payer: MEDICAID

## 2021-03-14 DIAGNOSIS — F43.23 ADJUSTMENT DISORDER WITH MIXED ANXIETY AND DEPRESSED MOOD: ICD-10-CM

## 2021-03-14 DIAGNOSIS — F13.939 BENZODIAZEPINE WITHDRAWAL WITH COMPLICATION (HCC): Primary | ICD-10-CM

## 2021-03-14 DIAGNOSIS — R56.9 SEIZURE (HCC): ICD-10-CM

## 2021-03-14 PROBLEM — R73.9 HYPERGLYCEMIA: Status: ACTIVE | Noted: 2021-03-14

## 2021-03-14 PROBLEM — D72.829 LEUKOCYTOSIS: Status: ACTIVE | Noted: 2021-03-14

## 2021-03-14 LAB
ALBUMIN SERPL BCP-MCNC: 4.3 G/DL (ref 3.2–4.9)
ALBUMIN/GLOB SERPL: 1.3 G/DL
ALP SERPL-CCNC: 77 U/L (ref 30–99)
ALT SERPL-CCNC: 33 U/L (ref 2–50)
ANION GAP SERPL CALC-SCNC: 13 MMOL/L (ref 7–16)
AST SERPL-CCNC: 28 U/L (ref 12–45)
B-HCG SERPL-ACNC: <0.5 MIU/ML (ref 0–10)
BASOPHILS # BLD AUTO: 0.4 % (ref 0–1.8)
BASOPHILS # BLD: 0.05 K/UL (ref 0–0.12)
BILIRUB SERPL-MCNC: 0.2 MG/DL (ref 0.1–1.5)
BUN SERPL-MCNC: 6 MG/DL (ref 8–22)
CALCIUM SERPL-MCNC: 9.3 MG/DL (ref 8.4–10.2)
CHLORIDE SERPL-SCNC: 103 MMOL/L (ref 96–112)
CK SERPL-CCNC: 140 U/L (ref 0–154)
CO2 SERPL-SCNC: 20 MMOL/L (ref 20–33)
CREAT SERPL-MCNC: 0.57 MG/DL (ref 0.5–1.4)
EOSINOPHIL # BLD AUTO: 0.05 K/UL (ref 0–0.51)
EOSINOPHIL NFR BLD: 0.4 % (ref 0–6.9)
ERYTHROCYTE [DISTWIDTH] IN BLOOD BY AUTOMATED COUNT: 38.4 FL (ref 35.9–50)
GLOBULIN SER CALC-MCNC: 3.2 G/DL (ref 1.9–3.5)
GLUCOSE SERPL-MCNC: 115 MG/DL (ref 65–99)
HCT VFR BLD AUTO: 44.2 % (ref 37–47)
HGB BLD-MCNC: 15.2 G/DL (ref 12–16)
IMM GRANULOCYTES # BLD AUTO: 0.04 K/UL (ref 0–0.11)
IMM GRANULOCYTES NFR BLD AUTO: 0.3 % (ref 0–0.9)
LYMPHOCYTES # BLD AUTO: 2.4 K/UL (ref 1–4.8)
LYMPHOCYTES NFR BLD: 20.7 % (ref 22–41)
MCH RBC QN AUTO: 31.1 PG (ref 27–33)
MCHC RBC AUTO-ENTMCNC: 34.4 G/DL (ref 33.6–35)
MCV RBC AUTO: 90.6 FL (ref 81.4–97.8)
MONOCYTES # BLD AUTO: 0.65 K/UL (ref 0–0.85)
MONOCYTES NFR BLD AUTO: 5.6 % (ref 0–13.4)
NEUTROPHILS # BLD AUTO: 8.4 K/UL (ref 2–7.15)
NEUTROPHILS NFR BLD: 72.6 % (ref 44–72)
NRBC # BLD AUTO: 0 K/UL
NRBC BLD-RTO: 0 /100 WBC
PLATELET # BLD AUTO: 336 K/UL (ref 164–446)
PMV BLD AUTO: 10.3 FL (ref 9–12.9)
POTASSIUM SERPL-SCNC: 4.2 MMOL/L (ref 3.6–5.5)
PROT SERPL-MCNC: 7.5 G/DL (ref 6–8.2)
RBC # BLD AUTO: 4.88 M/UL (ref 4.2–5.4)
SODIUM SERPL-SCNC: 136 MMOL/L (ref 135–145)
WBC # BLD AUTO: 11.6 K/UL (ref 4.8–10.8)

## 2021-03-14 PROCEDURE — 80053 COMPREHEN METABOLIC PANEL: CPT

## 2021-03-14 PROCEDURE — 99223 1ST HOSP IP/OBS HIGH 75: CPT | Performed by: INTERNAL MEDICINE

## 2021-03-14 PROCEDURE — 99285 EMERGENCY DEPT VISIT HI MDM: CPT

## 2021-03-14 PROCEDURE — 84702 CHORIONIC GONADOTROPIN TEST: CPT

## 2021-03-14 PROCEDURE — A9270 NON-COVERED ITEM OR SERVICE: HCPCS | Performed by: EMERGENCY MEDICINE

## 2021-03-14 PROCEDURE — 85025 COMPLETE CBC W/AUTO DIFF WBC: CPT

## 2021-03-14 PROCEDURE — 94760 N-INVAS EAR/PLS OXIMETRY 1: CPT

## 2021-03-14 PROCEDURE — 700102 HCHG RX REV CODE 250 W/ 637 OVERRIDE(OP): Performed by: EMERGENCY MEDICINE

## 2021-03-14 PROCEDURE — 700105 HCHG RX REV CODE 258: Performed by: EMERGENCY MEDICINE

## 2021-03-14 PROCEDURE — U0005 INFEC AGEN DETEC AMPLI PROBE: HCPCS

## 2021-03-14 PROCEDURE — 700111 HCHG RX REV CODE 636 W/ 250 OVERRIDE (IP)

## 2021-03-14 PROCEDURE — 36415 COLL VENOUS BLD VENIPUNCTURE: CPT

## 2021-03-14 PROCEDURE — 770006 HCHG ROOM/CARE - MED/SURG/GYN SEMI*

## 2021-03-14 PROCEDURE — 96375 TX/PRO/DX INJ NEW DRUG ADDON: CPT

## 2021-03-14 PROCEDURE — 700111 HCHG RX REV CODE 636 W/ 250 OVERRIDE (IP): Performed by: EMERGENCY MEDICINE

## 2021-03-14 PROCEDURE — 96374 THER/PROPH/DIAG INJ IV PUSH: CPT

## 2021-03-14 PROCEDURE — U0003 INFECTIOUS AGENT DETECTION BY NUCLEIC ACID (DNA OR RNA); SEVERE ACUTE RESPIRATORY SYNDROME CORONAVIRUS 2 (SARS-COV-2) (CORONAVIRUS DISEASE [COVID-19]), AMPLIFIED PROBE TECHNIQUE, MAKING USE OF HIGH THROUGHPUT TECHNOLOGIES AS DESCRIBED BY CMS-2020-01-R: HCPCS

## 2021-03-14 PROCEDURE — 82550 ASSAY OF CK (CPK): CPT

## 2021-03-14 RX ORDER — BUPROPION HYDROCHLORIDE 100 MG/1
150 TABLET ORAL DAILY
Status: DISCONTINUED | OUTPATIENT
Start: 2021-03-15 | End: 2021-03-15

## 2021-03-14 RX ORDER — VENLAFAXINE 75 MG/1
75 TABLET ORAL 2 TIMES DAILY
COMMUNITY
End: 2021-03-31

## 2021-03-14 RX ORDER — ONDANSETRON 2 MG/ML
4 INJECTION INTRAMUSCULAR; INTRAVENOUS ONCE
Status: COMPLETED | OUTPATIENT
Start: 2021-03-14 | End: 2021-03-14

## 2021-03-14 RX ORDER — LABETALOL HYDROCHLORIDE 5 MG/ML
10 INJECTION, SOLUTION INTRAVENOUS EVERY 4 HOURS PRN
Status: DISCONTINUED | OUTPATIENT
Start: 2021-03-14 | End: 2021-03-15 | Stop reason: HOSPADM

## 2021-03-14 RX ORDER — TRAZODONE HYDROCHLORIDE 50 MG/1
50 TABLET ORAL
COMMUNITY
End: 2023-08-01

## 2021-03-14 RX ORDER — ONDANSETRON 4 MG/1
4 TABLET, ORALLY DISINTEGRATING ORAL EVERY 4 HOURS PRN
Status: DISCONTINUED | OUTPATIENT
Start: 2021-03-14 | End: 2021-03-15 | Stop reason: HOSPADM

## 2021-03-14 RX ORDER — ACETAMINOPHEN 500 MG
1000 TABLET ORAL ONCE
Status: COMPLETED | OUTPATIENT
Start: 2021-03-14 | End: 2021-03-14

## 2021-03-14 RX ORDER — AMOXICILLIN 250 MG
2 CAPSULE ORAL 2 TIMES DAILY
Status: DISCONTINUED | OUTPATIENT
Start: 2021-03-14 | End: 2021-03-15 | Stop reason: HOSPADM

## 2021-03-14 RX ORDER — TRAZODONE HYDROCHLORIDE 50 MG/1
50 TABLET ORAL NIGHTLY
Status: DISCONTINUED | OUTPATIENT
Start: 2021-03-14 | End: 2021-03-14

## 2021-03-14 RX ORDER — HYDROXYZINE HYDROCHLORIDE 25 MG/1
25 TABLET, FILM COATED ORAL 3 TIMES DAILY
COMMUNITY
End: 2023-08-01

## 2021-03-14 RX ORDER — PRAZOSIN HYDROCHLORIDE 1 MG/1
1 CAPSULE ORAL NIGHTLY
Status: DISCONTINUED | OUTPATIENT
Start: 2021-03-14 | End: 2021-03-15 | Stop reason: HOSPADM

## 2021-03-14 RX ORDER — HYDROXYZINE HYDROCHLORIDE 25 MG/1
25 TABLET, FILM COATED ORAL 3 TIMES DAILY
Status: DISCONTINUED | OUTPATIENT
Start: 2021-03-15 | End: 2021-03-15 | Stop reason: HOSPADM

## 2021-03-14 RX ORDER — BUPROPION HYDROCHLORIDE 100 MG/1
150 TABLET ORAL DAILY
Status: ON HOLD | COMMUNITY
End: 2021-03-15

## 2021-03-14 RX ORDER — LORAZEPAM 2 MG/ML
2 INJECTION INTRAMUSCULAR ONCE
Status: COMPLETED | OUTPATIENT
Start: 2021-03-14 | End: 2021-03-14

## 2021-03-14 RX ORDER — POLYETHYLENE GLYCOL 3350 17 G/17G
1 POWDER, FOR SOLUTION ORAL
Status: DISCONTINUED | OUTPATIENT
Start: 2021-03-14 | End: 2021-03-15 | Stop reason: HOSPADM

## 2021-03-14 RX ORDER — SODIUM CHLORIDE 9 MG/ML
INJECTION, SOLUTION INTRAVENOUS CONTINUOUS
Status: DISCONTINUED | OUTPATIENT
Start: 2021-03-14 | End: 2021-03-15

## 2021-03-14 RX ORDER — ONDANSETRON 2 MG/ML
4 INJECTION INTRAMUSCULAR; INTRAVENOUS EVERY 4 HOURS PRN
Status: DISCONTINUED | OUTPATIENT
Start: 2021-03-14 | End: 2021-03-15 | Stop reason: HOSPADM

## 2021-03-14 RX ORDER — PROCHLORPERAZINE EDISYLATE 5 MG/ML
5-10 INJECTION INTRAMUSCULAR; INTRAVENOUS EVERY 4 HOURS PRN
Status: DISCONTINUED | OUTPATIENT
Start: 2021-03-14 | End: 2021-03-15 | Stop reason: HOSPADM

## 2021-03-14 RX ORDER — PRAZOSIN HYDROCHLORIDE 1 MG/1
1 CAPSULE ORAL NIGHTLY
COMMUNITY

## 2021-03-14 RX ORDER — LORAZEPAM 2 MG/ML
4 INJECTION INTRAMUSCULAR
Status: DISCONTINUED | OUTPATIENT
Start: 2021-03-14 | End: 2021-03-15 | Stop reason: HOSPADM

## 2021-03-14 RX ORDER — VENLAFAXINE 37.5 MG/1
75 TABLET ORAL 2 TIMES DAILY
Status: DISCONTINUED | OUTPATIENT
Start: 2021-03-15 | End: 2021-03-15 | Stop reason: HOSPADM

## 2021-03-14 RX ORDER — DIAZEPAM 2 MG/1
2 TABLET ORAL ONCE
Status: COMPLETED | OUTPATIENT
Start: 2021-03-14 | End: 2021-03-14

## 2021-03-14 RX ORDER — BISACODYL 10 MG
10 SUPPOSITORY, RECTAL RECTAL
Status: DISCONTINUED | OUTPATIENT
Start: 2021-03-14 | End: 2021-03-15 | Stop reason: HOSPADM

## 2021-03-14 RX ORDER — PROMETHAZINE HYDROCHLORIDE 25 MG/1
12.5-25 TABLET ORAL EVERY 4 HOURS PRN
Status: DISCONTINUED | OUTPATIENT
Start: 2021-03-14 | End: 2021-03-15 | Stop reason: HOSPADM

## 2021-03-14 RX ORDER — PROMETHAZINE HYDROCHLORIDE 25 MG/1
12.5-25 SUPPOSITORY RECTAL EVERY 4 HOURS PRN
Status: DISCONTINUED | OUTPATIENT
Start: 2021-03-14 | End: 2021-03-15 | Stop reason: HOSPADM

## 2021-03-14 RX ORDER — LORAZEPAM 2 MG/ML
2 INJECTION INTRAMUSCULAR ONCE
Status: DISCONTINUED | OUTPATIENT
Start: 2021-03-14 | End: 2021-03-14

## 2021-03-14 RX ORDER — SODIUM CHLORIDE 9 MG/ML
1000 INJECTION, SOLUTION INTRAVENOUS ONCE
Status: COMPLETED | OUTPATIENT
Start: 2021-03-14 | End: 2021-03-14

## 2021-03-14 RX ORDER — ACETAMINOPHEN 325 MG/1
650 TABLET ORAL EVERY 6 HOURS PRN
Status: DISCONTINUED | OUTPATIENT
Start: 2021-03-14 | End: 2021-03-15 | Stop reason: HOSPADM

## 2021-03-14 RX ADMIN — LORAZEPAM 2 MG: 2 INJECTION INTRAMUSCULAR; INTRAVENOUS at 20:56

## 2021-03-14 RX ADMIN — ACETAMINOPHEN 1000 MG: 500 TABLET, FILM COATED ORAL at 22:26

## 2021-03-14 RX ADMIN — SODIUM CHLORIDE 1000 ML: 9 INJECTION, SOLUTION INTRAVENOUS at 20:56

## 2021-03-14 RX ADMIN — ONDANSETRON 4 MG: 2 INJECTION INTRAMUSCULAR; INTRAVENOUS at 21:17

## 2021-03-14 RX ADMIN — DIAZEPAM 2 MG: 2 TABLET ORAL at 22:26

## 2021-03-14 ASSESSMENT — PATIENT HEALTH QUESTIONNAIRE - PHQ9
SUM OF ALL RESPONSES TO PHQ9 QUESTIONS 1 AND 2: 1
7. TROUBLE CONCENTRATING ON THINGS, SUCH AS READING THE NEWSPAPER OR WATCHING TELEVISION: NOT AT ALL
SUM OF ALL RESPONSES TO PHQ QUESTIONS 1-9: 3
1. LITTLE INTEREST OR PLEASURE IN DOING THINGS: NOT AT ALL
6. FEELING BAD ABOUT YOURSELF - OR THAT YOU ARE A FAILURE OR HAVE LET YOURSELF OR YOUR FAMILY DOWN: NOT AL ALL
2. FEELING DOWN, DEPRESSED, IRRITABLE, OR HOPELESS: SEVERAL DAYS
3. TROUBLE FALLING OR STAYING ASLEEP OR SLEEPING TOO MUCH: SEVERAL DAYS
9. THOUGHTS THAT YOU WOULD BE BETTER OFF DEAD, OR OF HURTING YOURSELF: NOT AT ALL
5. POOR APPETITE OR OVEREATING: NOT AT ALL
4. FEELING TIRED OR HAVING LITTLE ENERGY: SEVERAL DAYS
8. MOVING OR SPEAKING SO SLOWLY THAT OTHER PEOPLE COULD HAVE NOTICED. OR THE OPPOSITE, BEING SO FIGETY OR RESTLESS THAT YOU HAVE BEEN MOVING AROUND A LOT MORE THAN USUAL: NOT AT ALL

## 2021-03-14 ASSESSMENT — LIFESTYLE VARIABLES
EVER HAD A DRINK FIRST THING IN THE MORNING TO STEADY YOUR NERVES TO GET RID OF A HANGOVER: NO
TOTAL SCORE: 0
TOTAL SCORE: 0
HAVE YOU EVER FELT YOU SHOULD CUT DOWN ON YOUR DRINKING: NO
HAVE PEOPLE ANNOYED YOU BY CRITICIZING YOUR DRINKING: NO
CONSUMPTION TOTAL: NEGATIVE
EVER FELT BAD OR GUILTY ABOUT YOUR DRINKING: NO
TOTAL SCORE: 0
AVERAGE NUMBER OF DAYS PER WEEK YOU HAVE A DRINK CONTAINING ALCOHOL: 0
ALCOHOL_USE: NO
HOW MANY TIMES IN THE PAST YEAR HAVE YOU HAD 5 OR MORE DRINKS IN A DAY: 0
ON A TYPICAL DAY WHEN YOU DRINK ALCOHOL HOW MANY DRINKS DO YOU HAVE: 0
SUBSTANCE_ABUSE: 0

## 2021-03-14 ASSESSMENT — COGNITIVE AND FUNCTIONAL STATUS - GENERAL
MOBILITY SCORE: 24
SUGGESTED CMS G CODE MODIFIER DAILY ACTIVITY: CH
DAILY ACTIVITIY SCORE: 24
SUGGESTED CMS G CODE MODIFIER MOBILITY: CH

## 2021-03-14 ASSESSMENT — ENCOUNTER SYMPTOMS
DIZZINESS: 1
TINGLING: 0
ABDOMINAL PAIN: 0
HEADACHES: 1
DEPRESSION: 0
CONSTIPATION: 0
SENSORY CHANGE: 0
LOSS OF CONSCIOUSNESS: 0
DIARRHEA: 0
SHORTNESS OF BREATH: 0
FOCAL WEAKNESS: 0
SPEECH CHANGE: 0
BLURRED VISION: 0
NAUSEA: 1
FALLS: 0
SEIZURES: 1
STRIDOR: 0
VOMITING: 1
COUGH: 0
SPUTUM PRODUCTION: 0
CHILLS: 0
FEVER: 0
MYALGIAS: 0
TREMORS: 1
WEAKNESS: 0
PHOTOPHOBIA: 0
PALPITATIONS: 0
EYE PAIN: 0
NECK PAIN: 0

## 2021-03-14 ASSESSMENT — PAIN DESCRIPTION - PAIN TYPE: TYPE: ACUTE PAIN

## 2021-03-14 ASSESSMENT — FIBROSIS 4 INDEX: FIB4 SCORE: 0.41

## 2021-03-15 ENCOUNTER — NURSE TRIAGE (OUTPATIENT)
Dept: HEALTH INFORMATION MANAGEMENT | Facility: OTHER | Age: 26
End: 2021-03-15

## 2021-03-15 VITALS
BODY MASS INDEX: 42.42 KG/M2 | HEIGHT: 63 IN | HEART RATE: 105 BPM | TEMPERATURE: 97.8 F | DIASTOLIC BLOOD PRESSURE: 75 MMHG | OXYGEN SATURATION: 96 % | RESPIRATION RATE: 18 BRPM | SYSTOLIC BLOOD PRESSURE: 130 MMHG | WEIGHT: 239.42 LBS

## 2021-03-15 LAB
ANION GAP SERPL CALC-SCNC: 11 MMOL/L (ref 7–16)
BUN SERPL-MCNC: 5 MG/DL (ref 8–22)
CALCIUM SERPL-MCNC: 8.5 MG/DL (ref 8.4–10.2)
CHLORIDE SERPL-SCNC: 105 MMOL/L (ref 96–112)
CO2 SERPL-SCNC: 20 MMOL/L (ref 20–33)
CREAT SERPL-MCNC: 0.53 MG/DL (ref 0.5–1.4)
ERYTHROCYTE [DISTWIDTH] IN BLOOD BY AUTOMATED COUNT: 39.2 FL (ref 35.9–50)
GLUCOSE SERPL-MCNC: 103 MG/DL (ref 65–99)
HCT VFR BLD AUTO: 40.2 % (ref 37–47)
HGB BLD-MCNC: 13.5 G/DL (ref 12–16)
MCH RBC QN AUTO: 31 PG (ref 27–33)
MCHC RBC AUTO-ENTMCNC: 33.6 G/DL (ref 33.6–35)
MCV RBC AUTO: 92.2 FL (ref 81.4–97.8)
PLATELET # BLD AUTO: 292 K/UL (ref 164–446)
PMV BLD AUTO: 10.7 FL (ref 9–12.9)
POTASSIUM SERPL-SCNC: 3.9 MMOL/L (ref 3.6–5.5)
RBC # BLD AUTO: 4.36 M/UL (ref 4.2–5.4)
SARS-COV-2 RNA RESP QL NAA+PROBE: NOTDETECTED
SODIUM SERPL-SCNC: 136 MMOL/L (ref 135–145)
SPECIMEN SOURCE: NORMAL
WBC # BLD AUTO: 10.2 K/UL (ref 4.8–10.8)

## 2021-03-15 PROCEDURE — 85027 COMPLETE CBC AUTOMATED: CPT

## 2021-03-15 PROCEDURE — 80048 BASIC METABOLIC PNL TOTAL CA: CPT

## 2021-03-15 PROCEDURE — 99239 HOSP IP/OBS DSCHRG MGMT >30: CPT | Performed by: STUDENT IN AN ORGANIZED HEALTH CARE EDUCATION/TRAINING PROGRAM

## 2021-03-15 PROCEDURE — 700102 HCHG RX REV CODE 250 W/ 637 OVERRIDE(OP): Performed by: INTERNAL MEDICINE

## 2021-03-15 PROCEDURE — A9270 NON-COVERED ITEM OR SERVICE: HCPCS | Performed by: INTERNAL MEDICINE

## 2021-03-15 PROCEDURE — 700111 HCHG RX REV CODE 636 W/ 250 OVERRIDE (IP): Performed by: INTERNAL MEDICINE

## 2021-03-15 PROCEDURE — 700105 HCHG RX REV CODE 258: Performed by: INTERNAL MEDICINE

## 2021-03-15 PROCEDURE — 36415 COLL VENOUS BLD VENIPUNCTURE: CPT

## 2021-03-15 RX ORDER — DIAZEPAM 5 MG/1
5 TABLET ORAL
Qty: 5 TABLET | Refills: 0 | Status: SHIPPED | OUTPATIENT
Start: 2021-03-15 | End: 2021-03-20

## 2021-03-15 RX ORDER — BUPROPION HYDROCHLORIDE 150 MG/1
150 TABLET, EXTENDED RELEASE ORAL DAILY
Status: ON HOLD | COMMUNITY
End: 2021-03-15

## 2021-03-15 RX ORDER — ACETAMINOPHEN 500 MG
500-1000 TABLET ORAL EVERY 6 HOURS PRN
COMMUNITY
End: 2021-03-31

## 2021-03-15 RX ORDER — CLONAZEPAM 1 MG/1
1 TABLET ORAL 3 TIMES DAILY
Status: ON HOLD | COMMUNITY
End: 2021-03-15

## 2021-03-15 RX ADMIN — SODIUM CHLORIDE: 9 INJECTION, SOLUTION INTRAVENOUS at 00:02

## 2021-03-15 RX ADMIN — PRAZOSIN HYDROCHLORIDE 1 MG: 1 CAPSULE ORAL at 00:02

## 2021-03-15 RX ADMIN — VENLAFAXINE 75 MG: 37.5 TABLET ORAL at 06:03

## 2021-03-15 RX ADMIN — ENOXAPARIN SODIUM 40 MG: 40 INJECTION SUBCUTANEOUS at 06:04

## 2021-03-15 RX ADMIN — HYDROXYZINE HYDROCHLORIDE 25 MG: 25 TABLET, FILM COATED ORAL at 06:03

## 2021-03-15 NOTE — PROGRESS NOTES
Pt A & O x4. RA. No c/o sob, pain, nausea, vomiting. MD ordered discharge. Went over all instructions and new medications with patient. Answered all questions to her satisfaction. Patient will be picked up by family. Discharged with all belongings. Will continue to monitor.

## 2021-03-15 NOTE — ASSESSMENT & PLAN NOTE
-No history of seizure, x2 today  -Likely due to sudden cessation of Klonopin causing benzodiazepine withdrawal, Wellbutrin and also decreases seizure threshold  -Benzodiazepine withdrawal would explain all of her symptoms which have been going on since she suddenly stopped Klonopin  -Patient was given Valium in the ER  -Ativan as needed seizures  -Seizure precautions with close monitoring

## 2021-03-15 NOTE — ED TRIAGE NOTES
Health Maintenance Summary     Topic Due On Due Status Completed On    Colorectal Cancer Screening - Colonoscopy Jan 1, 2024 Not Due Jan 1, 2014    Immunization - Pneumococcal Jan 1, 2017 Overdue Jan 1, 2016    Diabetes Eye Exam Edison 10, 2019 Not Due Ediosn 10, 2018    Glycohemoglobin A1C  (Diabetes Sugar)  Jun 29, 2018 Not Due Dec 29, 2017    GFR  (Kidney Function Test)  Jan 4, 2019 Not Due Jan 4, 2018    Diabetes Foot Exam  Jan 29, 2019 Not Due Jan 29, 2018    Medicare Wellness Visit Oct 25, 2018 Not Due Oct 25, 2017    IMMUNIZATION - DTaP/Tdap/Td Jan 1, 2027 Not Due Jan 1, 2017    Immunization-Influenza  Completed Sep 22, 2017    Depression Screening Oct 27, 2018 Not Due Oct 27, 2017    Hepatitis C Screening Mar 17, 1999 Overdue           Patient is due for topics as listed above, he wishes to decline at this time .             Pt presents to the ED after experiencing 2 seizures lasting up to 5 minutes. Pt bit tongue on both sides, no bleeding at this time. Pt has never had seizures before. Also states she has new medication changes.

## 2021-03-15 NOTE — ED NOTES
Pt awake, amb with mom to room. Seizure pads on gurney, vs as charted. Saline lock established with blood draw, placed on monitor-st. States told to stop taking clonazepam 1mg po tid 1 week ago-had been taking for 1 year.

## 2021-03-15 NOTE — ASSESSMENT & PLAN NOTE
-Is followed as an outpatient with psychiatry  -Continue home meds  -Recently had her Klonopin stopped causing seizures, will not restart, will just use benzodiazepines as needed seizure, was given a dose of Valium in the ER

## 2021-03-15 NOTE — ED NOTES
"Pt rounding, no further seizure activity per pt and mom. C/o 7/10 frontal h/a, \"has had all day\", took tylenol 500mg po for same around noon w/o improvement. Will update erp  "

## 2021-03-15 NOTE — PROGRESS NOTES
Med rec completed   Allergies reviewed   No PO antibiotics in the last 14 days    Patient was told to stop taking her Clonazepam about 1 week ago

## 2021-03-15 NOTE — DISCHARGE SUMMARY
Discharge Summary    CHIEF COMPLAINT ON ADMISSION  Chief Complaint   Patient presents with   • Seizure     x2. never had seizures in the past. just started taking new medications.        Reason for Admission  Seizure      Admission Date  3/14/2021    CODE STATUS  Full Code    HPI & HOSPITAL COURSE  This is a 25 y.o. female with a past medical history of anxiety and depression being followed by psychiatry at [facility], she presented here after suspected seizure activity.  Per patient she has been taking Klonopin 1 mg 3 times a day for approximately 1 year, she was recently transitioned to a new psychiatrist who did not want to continue this and transitioned her to other medications including Wellbutrin, he states that her last dose of Klonopin was approximately 1 week ago (per her PDMP, she had a 30 day script filled on 2/11) and since then she has experienced frequent headaches nausea vomiting and dizziness.  She states she had 2 seizures 1 of which was witnessed by her father prompting them to come to the emergency room for further evaluation.   Suspect that patient's seizure-like activity was provoked by benzodiazepine withdrawal.  Her metabolic work-up has been negative and patient is back at her baseline.  She has had no further seizure activity.  I recommend that she follow-up with her psychiatrist for medication management.  I will provide her 5 days worth of Valium for withdrawal symptoms until she is able to be seen,  we recommended that she discontinue Wellbutrin at this time as this medication can lower seizure threshold.  She will continue her other psychiatric medications.   At the time of discharge patient is awake, alert, speaking in full sentences and is ambulatory and eating well.  She is comfortable with discharge plan.     Therefore, she is discharged in good and stable condition to home with close outpatient follow-up.    The patient recovered much more quickly than anticipated on  admission.    Discharge Date  3/15/2021    FOLLOW UP ITEMS POST DISCHARGE  Psychiatric medication management    DISCHARGE DIAGNOSES  Active Problems:    Seizures (HCC) POA: Yes    Adjustment reaction with anxiety and depression POA: Yes      Overview: IMO load March 2020    Leukocytosis POA: Yes    Hyperglycemia POA: Yes  Resolved Problems:    * No resolved hospital problems. *      FOLLOW UP  No future appointments.  Freida Strange M.D.  [address] [address]  [phone]    In 1 week  hospital follow up    [facility]  [address] [facility] [address] [facility]  [place] [id number] [phone]  In 1 week  hospital follow up      MEDICATIONS ON DISCHARGE     Medication List      START taking these medications      Instructions   diazePAM 5 MG Tabs  Commonly known as: VALIUM   Take 1 tablet by mouth 1 time a day as needed for Anxiety for up to 5 days.  Dose: 5 mg        CONTINUE taking these medications      Instructions   acetaminophen 500 MG Tabs  Commonly known as: TYLENOL   Take 500-1,000 mg by mouth every 6 hours as needed.  Dose: 500-1,000 mg     hydrOXYzine HCl 25 MG Tabs  Commonly known as: ATARAX   Take 25 mg by mouth 3 times a day.  Dose: 25 mg     prazosin 1 MG Caps  Commonly known as: MINIPRESS   Take 1 mg by mouth every evening.  Dose: 1 mg     traZODone 50 MG Tabs  Commonly known as: DESYREL   Take 50 mg by mouth every evening.  Dose: 50 mg     venlafaxine 75 MG Tabs  Commonly known as: EFFEXOR   Take 75 mg by mouth 2 Times a Day.  Dose: 75 mg        STOP taking these medications    buPROPion  MG Tb12 sustained-release tablet  Commonly known as: WELLBUTRIN-SR     clonazePAM 1 MG Tabs  Commonly known as: KLONOPIN            Allergies  No Known Allergies    DIET  Orders Placed This Encounter   Procedures   • Diet Order Diet: Regular     Standing Status:   Standing     Number of Occurrences:   1     Order Specific Question:   Diet:     Answer:    Regular [1]       ACTIVITY  As tolerated.      CONSULTATIONS  N/A    PROCEDURES  N/A    LABORATORY  Lab Results   Component Value Date    SODIUM 136 03/15/2021    POTASSIUM 3.9 03/15/2021    CHLORIDE 105 03/15/2021    CO2 20 03/15/2021    GLUCOSE 103 (H) 03/15/2021    BUN 5 (L) 03/15/2021    CREATININE 0.53 03/15/2021        Lab Results   Component Value Date    WBC 10.2 03/15/2021    HEMOGLOBIN 13.5 03/15/2021    HEMATOCRIT 40.2 03/15/2021    PLATELETCT 292 03/15/2021        Total time of the discharge process exceeds 32 minutes.

## 2021-03-15 NOTE — DIETARY
NUTRITION SERVICES: BMI - Pt with BMI >40 (=Body mass index is 42.41 kg/m².), morbid obesity. Weight loss counseling not appropriate in acute care setting. RECOMMEND - Referral to outpatient nutrition services for weight management after D/C.

## 2021-03-15 NOTE — ED NOTES
Per parent request and pt approval, radha monterroso called at [phone] and pt room # provided to same

## 2021-03-15 NOTE — ED NOTES
Med per erp, states still with some nausea. erp aware, oxygen placed via 1L n/c to assist pt with relaxation and deep breathing.

## 2021-03-15 NOTE — H&P
Hospital Medicine History & Physical Note    Date of Service  3/14/2021    Primary Care Physician  Freida Strange M.D.    Consultants  None    Code Status  Full Code    Chief Complaint  Chief Complaint   Patient presents with   • Seizure     x2. never had seizures in the past. just started taking new medications.        History of Presenting Illness  25 y.o. female who presented 3/14/2021 with seizure x2. Patient says she has been on Klonopin, 1 mg 3 times a day for approximately 1 year. She states her old psychiatrist who provided this left and so her new psychiatrist did not want to continue it due to its addictive potential. Patient was then transition to multiple other medications including Wellbutrin. I am not sure if this was supposed to be tapered however it sounds like patient abruptly stopped the medication 1 week ago. Patient also started the other meds 1 week ago. She states since then she had frequent headaches, nausea and vomiting and dizziness. Today she had seizures x2, second one witnessed. Because of this, she presented to the emergency department. I did discuss the case including labs with the ER physician.    Review of Systems  Review of Systems   Constitutional: Negative for chills, fever and malaise/fatigue.   HENT: Negative for congestion.    Respiratory: Negative for cough, sputum production, shortness of breath and stridor.    Cardiovascular: Negative for chest pain, palpitations and leg swelling.   Gastrointestinal: Positive for nausea and vomiting. Negative for abdominal pain, constipation and diarrhea.   Genitourinary: Negative for dysuria and urgency.   Musculoskeletal: Negative for falls and myalgias.   Neurological: Positive for dizziness, seizures and headaches. Negative for tingling, loss of consciousness and weakness.   Psychiatric/Behavioral: Negative for depression and suicidal ideas.   All other systems reviewed and are negative.      Past Medical History   has a past medical  history of Anxiety, Depression, and Depression.    Surgical History  None    Family History  family history is not on file. She was adopted.     Social History   reports that she has quit smoking. Her smoking use included cigarettes. She smoked 0.10 packs per day. She has never used smokeless tobacco. She reports that she does not drink alcohol and does not use drugs.    Allergies  No Known Allergies    Medications  Prior to Admission Medications   Prescriptions Last Dose Informant Patient Reported? Taking?   Chlorphen-Pseudoephed-APAP (THERAFLU FLU/COLD PO)  Patient Yes No   Sig: Take 1 Package by mouth 3 times a day as needed (flu symptoms).   buPROPion (WELLBUTRIN) 100 MG Tab 3/14/2021 at Unknown time  Yes Yes   Sig: Take 150 mg by mouth every day.   diphenoxylate-atropine (LOMOTIL) 2.5-0.025 MG Tab  Patient Yes No   Sig: Take 2 Tabs by mouth every 6 hours as needed for Diarrhea.   hydrOXYzine HCl (ATARAX) 25 MG Tab 3/14/2021 at Unknown time  Yes Yes   Sig: Take 25 mg by mouth 3 times a day.   ondansetron (ZOFRAN ODT) 4 MG TABLET DISPERSIBLE  Patient Yes No   Sig: Take 4 mg by mouth every 6 hours as needed for Nausea.   prazosin (MINIPRESS) 1 MG Cap 3/13/2021 at Unknown time  Yes Yes   Sig: Take 1 mg by mouth every evening.   traZODone (DESYREL) 50 MG Tab   Yes Yes   Sig: Take 50 mg by mouth every evening. 1/2 tab   venlafaxine (EFFEXOR) 75 MG Tab 3/14/2021 at Unknown time  Yes Yes   Sig: Take 75 mg by mouth 2 Times a Day.      Facility-Administered Medications: None       Physical Exam  Temp:  [37.1 °C (98.8 °F)] 37.1 °C (98.8 °F)  Pulse:  [103-119] 103  Resp:  [20] 20  BP: (115-157)/() 128/82  SpO2:  [95 %-100 %] 99 %    Physical Exam  Vitals and nursing note reviewed.   Constitutional:       General: She is not in acute distress.     Appearance: She is well-developed. She is obese. She is not diaphoretic.   HENT:      Head: Normocephalic and atraumatic.      Right Ear: External ear normal.      Left Ear:  External ear normal.      Nose: Nose normal. No congestion or rhinorrhea.      Mouth/Throat:      Mouth: Mucous membranes are dry.      Pharynx: No oropharyngeal exudate.   Eyes:      General:         Right eye: No discharge.         Left eye: No discharge.      Extraocular Movements: Extraocular movements intact.   Neck:      Trachea: No tracheal deviation.   Cardiovascular:      Rate and Rhythm: Regular rhythm. Tachycardia present.      Heart sounds: No murmur. No friction rub. No gallop.    Pulmonary:      Effort: Pulmonary effort is normal. No respiratory distress.      Breath sounds: Normal breath sounds. No stridor. No wheezing or rales.   Chest:      Chest wall: No tenderness.   Abdominal:      General: Bowel sounds are normal. There is no distension.      Palpations: Abdomen is soft.      Tenderness: There is no abdominal tenderness.   Musculoskeletal:         General: No tenderness. Normal range of motion.      Cervical back: Normal range of motion and neck supple.      Right lower leg: No edema.      Left lower leg: No edema.   Lymphadenopathy:      Cervical: No cervical adenopathy.   Skin:     General: Skin is warm and dry.      Coloration: Skin is not jaundiced.      Findings: No erythema or rash.   Neurological:      General: No focal deficit present.      Mental Status: She is alert and oriented to person, place, and time.      Cranial Nerves: No cranial nerve deficit.   Psychiatric:         Mood and Affect: Mood normal.         Behavior: Behavior normal.         Thought Content: Thought content normal.         Judgment: Judgment normal.         Laboratory:  Recent Labs     03/14/21 2045   WBC 11.6*   RBC 4.88   HEMOGLOBIN 15.2   HEMATOCRIT 44.2   MCV 90.6   MCH 31.1   MCHC 34.4   RDW 38.4   PLATELETCT 336   MPV 10.3     Recent Labs     03/14/21 2045   SODIUM 136   POTASSIUM 4.2   CHLORIDE 103   CO2 20   GLUCOSE 115*   BUN 6*   CREATININE 0.57   CALCIUM 9.3     Recent Labs     03/14/21 2045    ALTSGPT 33   ASTSGOT 28   ALKPHOSPHAT 77   TBILIRUBIN 0.2   GLUCOSE 115*         No results for input(s): NTPROBNP in the last 72 hours.      No results for input(s): TROPONINT in the last 72 hours.    Imaging:  No orders to display         Assessment/Plan:  I anticipate this patient will require at least two midnights for appropriate medical management, necessitating inpatient admission.    Seizures (HCC)- (present on admission)  Assessment & Plan  -No history of seizure, x2 today  -Likely due to sudden cessation of Klonopin causing benzodiazepine withdrawal, Wellbutrin and also decreases seizure threshold  -Benzodiazepine withdrawal would explain all of her symptoms which have been going on since she suddenly stopped Klonopin  -Patient was given Valium in the ER  -Ativan as needed seizures  -Seizure precautions with close monitoring    Hyperglycemia- (present on admission)  Assessment & Plan  -Mild, no need for coverage  -No recent hemoglobin A1c    Leukocytosis- (present on admission)  Assessment & Plan  -Mild, reactive from seizures as well as vomiting    Adjustment reaction with anxiety and depression- (present on admission)  Assessment & Plan  -Is followed as an outpatient with psychiatry  -Continue home meds  -Recently had her Klonopin stopped causing seizures, will not restart, will just use benzodiazepines as needed seizure, was given a dose of Valium in the ER

## 2021-03-15 NOTE — PROGRESS NOTES
Assumed care of the patient after she arrived from the ED. She is A/Ox4, VSS. She denies any pain or needs at this time. Bed is in the lowest locked position, bed rail are padded, call light in reach. Encouraged patient to call with any needs.

## 2021-03-15 NOTE — DISCHARGE INSTRUCTIONS
Take medications as directed   Follow up with your PCP and psychiatrist in the next 1-2 weeks     Discharge Instructions    Discharged to home by car with relative. Discharged via walking, hospital escort: Refused.  Special equipment needed: Not Applicable    Be sure to schedule a follow-up appointment with your primary care doctor or any specialists as instructed.     Discharge Plan:   Diet Plan: Discussed  Activity Level: Discussed  Confirmed Follow up Appointment: Patient to Call and Schedule Appointment  Confirmed Symptoms Management: Discussed  Medication Reconciliation Updated: Yes  Influenza Vaccine Indication: Not indicated: Previously immunized this influenza season and > 8 years of age    I understand that a diet low in cholesterol, fat, and sodium is recommended for good health. Unless I have been given specific instructions below for another diet, I accept this instruction as my diet prescription.   Other diet: Regualr    Special Instructions: None    · Is patient discharged on Warfarin / Coumadin?   No     Depression / Suicide Risk    As you are discharged from this [facility], it is important to learn how to keep safe from harming yourself.    Recognize the warning signs:  · Abrupt changes in personality, positive or negative- including increase in energy   · Giving away possessions  · Change in eating patterns- significant weight changes-  positive or negative  · Change in sleeping patterns- unable to sleep or sleeping all the time   · Unwillingness or inability to communicate  · Depression  · Unusual sadness, discouragement and loneliness  · Talk of wanting to die  · Neglect of personal appearance   · Rebelliousness- reckless behavior  · Withdrawal from people/activities they love  · Confusion- inability to concentrate     If you or a loved one observes any of these behaviors or has concerns about self-harm, here's what you can do:  · Talk about it- your feelings and reasons for harming  yourself  · Remove any means that you might use to hurt yourself (examples: pills, rope, extension cords, firearm)  · Get professional help from the community (Mental Health, Substance Abuse, psychological counseling)  · Do not be alone:Call your Safe Contact- someone whom you trust who will be there for you.  · Call your local CRISIS HOTLINE 368-2181 or 398-662-1440  · Call your local Children's Mobile Crisis Response Team [place] (693) 820-2273 or wwwDormNoise  · Call the toll free National Suicide Prevention Hotlines   · National Suicide Prevention Lifeline 811-295-SNZR (8095)  · "[employer]" Hope Line Network 800-SUICIDE (587-3319)        Benzodiazepine Withdrawal  Benzodiazepines are prescription medicines that decrease the activity of (depress) the central nervous system and cause changes in certain brain chemicals (neurotransmitters). There are many types of benzodiazepines. Some benzodiazepines take effect quickly and stay in your body for a short amount of time (short-acting). Other benzodiazepines require more time to take effect and stay in your body for longer amounts of time (long-acting). The five most commonly prescribed benzodiazepines are:  · Alprazolam.  · Lorazepam.  · Clonazepam.  · Diazepam.  · Temazepam.  Withdrawal is a group of physical and mental symptoms that can happen when you suddenly stop taking a medicine.  What are the causes?  When you take benzodiazepines, your brain needs more and more of the medicine over time to get the same effects from it. This increased need is called tolerance. As you develop a tolerance, your brain adapts to the effects of the benzodiazepine and relies on these effects. This is called dependency. Withdrawal happens when you suddenly stop taking your medicine. This does not give your brain enough time to adapt to not having the medicine.  What increases the risk?  You are more likely to develop this condition if you:  · Have taken benzodiazepines  for more than 1-2 weeks.  · Have developed a tolerance for benzodiazepines.  · Have developed a dependence on benzodiazepines.  · Take high dosages of benzodiazepines.  · Take doses of benzodiazepines that are higher than prescribed.  · Take benzodiazepines without a prescription.  · Use benzodiazepines with other substances that depress the central nervous system, such as alcohol.  · Have a history of drug or alcohol abuse.  What are the signs or symptoms?  Symptoms of this condition may include:  · Difficulty sleeping.  · Anxiety.  · Restlessness.  · Irritability.  · Muscle aches.  · Involuntary shaking or trembling of a body part (tremor).  · Confusion and poor concentration.  · Vomiting.  · Sweating.  · Headaches.  · Feeling or seeing things that are not there (hallucinations).  · Seizures.  Symptoms of withdrawal from short-acting benzodiazepines may develop 1-2 days after you stop taking your medicine, and they may last for 2-4 weeks or longer.  Symptoms of withdrawal from long-acting benzodiazepines may develop 2-7 days after you stop taking your medicine, and they may last for 2-8 weeks or longer.  How is this diagnosed?  This condition may be diagnosed based on:  · Your symptoms.  · A physical exam. Your health care provider may check for:  ? Rapid heartbeat.  ? Rapid breathing.  ? Tremors.  ? High blood pressure.  · Blood tests.  · Urine tests.  · Your alcohol and drug habits.  · Your medical history.  How is this treated?  Treatment usually involves starting you on a safe and stable dose of a benzodiazepine and then slowly lowering your dosage over time (tapered withdrawal). This may be done at a hospital or a treatment center. Treatment for this condition depends on:  · Your symptoms.  · The type of benzodiazepine you have been taking.  · How long you have been taking benzodiazepines.  Long-term treatment for this condition may involve medicine, counseling, and support groups.  Follow these instructions  at home:    · Take over-the-counter and prescription medicines only as told by your health care provider.  · Check with your health care provider before starting new medicines.  · Keep all follow-up visits as told by your health care provider. This is important.  How is this prevented?  · Do not take any benzodiazepines without a prescription.  · Do not take more than your prescribed dosage.  · Do not mix benzodiazepines with alcohol or other medicines.  · Do not stop taking benzodiazepines without speaking with your health care provider.  Contact a health care provider if you:  · Are not able to take your medicines as told by your health care provider.  · Have symptoms that get worse.  · Develop withdrawal symptoms during your tapered withdrawal.  · Develop a craving for drugs or alcohol.  · Experience withdrawal again (relapse).  Get help right away if you:  · Have a seizure.  · Become very confused.  · Lose consciousness.  · Have difficulty breathing.  · Have serious thoughts about hurting yourself or someone else.  If you ever feel like you may hurt yourself or others, or have thoughts about taking your own life, get help right away. You can go to your nearest emergency department or call:  · Your local emergency services (911 in the [place]  · A suicide crisis helpline, such as the National Suicide Prevention Lifeline at 1-159.775.8695. This is open 24 hours a day.  Summary  · Withdrawal is a group of physical and mental symptoms that can happen when you suddenly stop taking a benzodiazepine.  · Treatment usually involves starting on a safe and stable dose of a benzodiazepine and then slowly lowering the dosage over time.  · The type of treatment depends on your symptoms, how long you were taking the medicine, and which type of medicine you were taking.  · Long-term treatment may include medicine, counseling, and support groups.  This information is not intended to replace advice given to you by your health care  provider. Make sure you discuss any questions you have with your health care provider.  Document Released: 12/06/2012 Document Revised: 10/01/2019 Document Reviewed: 09/04/2019  Elsevier Patient Education © 2020 [employer]

## 2021-03-15 NOTE — ED PROVIDER NOTES
"ED Provider Note     3/14/2021  10:17 PM    Means of Arrival: Walk In  History obtained by: patient, mother  Limitations: None  PCP: Freida Strange  CODE STATUS: Full    CHIEF COMPLAINT  Chief Complaint   Patient presents with   • Seizure     x2. never had seizures in the past. just started taking new medications.        STORM Leavitt is a 25 y.o. female with anxiety and depression who presents after having 2 seizures today.  She has not been feeling well for the past week.  She has had increased anxiety, nausea with some vomiting.  She is also felt very jittery.  Today she had episode where she passed out when she awoke she was not sure what happened.  Her father then came home and witnessed her having a seizure.  There is no incontinence but she did bite her tongue.  EMS was called because seizure was lasting \"5 to 10 minutes.\"  When EMS arrived seizure had stopped and she was waking up.  She was brought here for further evaluation.  She recently had a number of medication changes this past week.  She goes to [facility] for treatment of anxiety and depression.  She had been taking clonazepam 2 mg 3 times a day for the past year and abruptly stopped last week.  She was started on bupropion, hydroxyzine, prazosin.  She continues to take Effexor.      REVIEW OF SYSTEMS  Review of Systems   Constitutional: Negative for chills and fever.   HENT: Negative for congestion.    Eyes: Negative for blurred vision, photophobia and pain.   Respiratory: Negative for cough and shortness of breath.    Cardiovascular: Negative for chest pain and palpitations.   Gastrointestinal: Positive for nausea and vomiting. Negative for abdominal pain.   Genitourinary: Negative for dysuria and frequency.   Musculoskeletal: Negative for neck pain.   Neurological: Positive for tremors. Negative for sensory change, speech change and focal weakness. Headaches: gradual headache over past week, generalized. Not maximal at onset.  " "  Psychiatric/Behavioral: Negative for substance abuse.   All other systems reviewed and are negative.    See HPI for further details.    PAST MEDICAL HISTORY   has a past medical history of Anxiety, Depression, and Depression.    SOCIAL HISTORY  Social History     Tobacco Use   • Smoking status: Former Smoker     Packs/day: 0.10     Types: Cigarettes   • Smokeless tobacco: Never Used   Substance and Sexual Activity   • Alcohol use: No   • Drug use: No   • Sexual activity: Never       SURGICAL HISTORY  patient denies any surgical history    CURRENT MEDICATIONS  Home Medications     Reviewed by Tiana Castillo R.N. (Registered Nurse) on 03/14/21 at 2349  Med List Status: Complete   Medication Last Dose Status   buPROPion (WELLBUTRIN) 100 MG Tab 3/14/2021 Active   Chlorphen-Pseudoephed-APAP (THERAFLU FLU/COLD PO)  Active   diphenoxylate-atropine (LOMOTIL) 2.5-0.025 MG Tab  Active   hydrOXYzine HCl (ATARAX) 25 MG Tab 3/14/2021 Active   ondansetron (ZOFRAN ODT) 4 MG TABLET DISPERSIBLE  Active   prazosin (MINIPRESS) 1 MG Cap 3/13/2021 Active   traZODone (DESYREL) 50 MG Tab 3/13/2021 Active   venlafaxine (EFFEXOR) 75 MG Tab 3/13/2021 Active                ALLERGIES  No Known Allergies    PHYSICAL EXAM  VITAL SIGNS: /90   Pulse (!) 113 Comment: RN notified  Temp 36.7 °C (98 °F) (Oral)   Resp 19   Ht 1.6 m (5' 3\")   Wt 109 kg (239 lb 6.7 oz)   LMP 02/28/2021 (Exact Date)   SpO2 95%   Breastfeeding No   BMI 42.41 kg/m²     Pulse ox interpretation: I interpret this pulse ox as normal.  Constitutional: Alert in no apparent distress.  Pleasant 25-year-old woman with elevated BMI body habitus.  HENT: No signs of trauma, Bilateral external ears normal, Nose normal.  Bruising and bite marks on anterior and right lateral tongue.  Tongue fasciculations present.  Eyes: Pupils are equal, Conjunctiva normal, Non-icteric.  No nystagmus.  Normal eye movements.  Neck: Normal range of motion, No tenderness, Supple, No " stridor.   Cardiovascular: Increased rate and regular rhythm, no murmurs. Symmetric distal pulses. No cyanosis of extremities. No peripheral edema of extremities.  Thorax & Lungs: Normal breath sounds, No respiratory distress, No wheezing, No chest tenderness.   Abdomen: Bowel sounds normal, Soft, No tenderness, No masses, No pulsatile masses. No peritoneal signs.  Skin: Warm, Dry, No erythema, No rash.   Back: No midline bony tenderness, No CVA tenderness.   Musculoskeletal: Good range of motion in all major joints. No tenderness to palpation or major deformities noted.   Neurologic: Alert and oriented person place time situation.  Clear speech.  Tremor in extremities.  Normal strength and coordination.  No aphasia.  Psychiatric: Pleasant, anxious.  Physical Exam      DIAGNOSTIC STUDIES / PROCEDURES      LABS  Pertinent Labs & Imaging studies reviewed. (See chart for details)    RADIOLOGY  Pertinent Labs & Imaging studies reviewed. (See chart for details)    COURSE & MEDICAL DECISION MAKING  Pertinent Labs & Imaging studies reviewed. (See chart for details)    10:17 PM This is an emergent evaluation of a  25 y.o. female who presents after having 2 seizures today.  No prior history of seizures.  On exam she appears to be in benzodiazepine withdrawal given tachycardia, tremor, anxious.  She is also had nausea vomiting and a headache this week.  No photophobia.  No fevers.  Less likely infectious in nature.  Immediately following evaluation IV placed and she was given 2 mg IV Ativan.  This had little sedating effect.  However her heart rate has decreased and tremor improved.  Lab work CBC, CMP, pregnancy test done (inadvertently ordered quantitative test instead of qualitative test).  Not pregnant.  CBC and CMP within acceptable limits.  Will be given a Tylenol for headache.  Because of seizures in setting of benzodiazepine withdrawal I believe she should be hospitalized for further treatment of withdraw, evaluation  of seizures.  If headache does not dramatically improve may need imaging, but at this point I believe it is low yield given otherwise normal neurologic exam.    Dr. Mendoza, hospitalist, has arranged for hospitalization continue treatment for benzodiazepine withdrawal.      FINAL IMPRESSION    ICD-10-CM   1. Benzodiazepine withdrawal with complication (HCC)  F13.239   2. Seizure (HCC)  R56.9            This dictation was created using voice recognition software. The accuracy of the dictation is limited to the abilities of the software. I expect there may be some errors of grammar and possibly content. The nursing notes were reviewed and certain aspects of this information were incorporated into this note.    Electronically signed by: Kannan Roberts II, M.D., 3/14/2021 10:17 PM
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
## 2021-03-20 PROCEDURE — 99284 EMERGENCY DEPT VISIT MOD MDM: CPT

## 2021-03-21 ENCOUNTER — HOSPITAL ENCOUNTER (EMERGENCY)
Facility: MEDICAL CENTER | Age: 26
End: 2021-03-21
Attending: EMERGENCY MEDICINE
Payer: MEDICAID

## 2021-03-21 VITALS
DIASTOLIC BLOOD PRESSURE: 82 MMHG | TEMPERATURE: 97.9 F | RESPIRATION RATE: 16 BRPM | WEIGHT: 239.2 LBS | OXYGEN SATURATION: 100 % | SYSTOLIC BLOOD PRESSURE: 135 MMHG | BODY MASS INDEX: 42.37 KG/M2 | HEART RATE: 84 BPM

## 2021-03-21 DIAGNOSIS — F13.20 BENZODIAZEPINE DEPENDENCE (HCC): ICD-10-CM

## 2021-03-21 DIAGNOSIS — R56.9 SEIZURE (HCC): ICD-10-CM

## 2021-03-21 LAB
ANION GAP SERPL CALC-SCNC: 11 MMOL/L (ref 7–16)
BASOPHILS # BLD AUTO: 0.5 % (ref 0–1.8)
BASOPHILS # BLD: 0.05 K/UL (ref 0–0.12)
BUN SERPL-MCNC: 8 MG/DL (ref 8–22)
CALCIUM SERPL-MCNC: 9.6 MG/DL (ref 8.4–10.2)
CHLORIDE SERPL-SCNC: 103 MMOL/L (ref 96–112)
CO2 SERPL-SCNC: 23 MMOL/L (ref 20–33)
CREAT SERPL-MCNC: 0.57 MG/DL (ref 0.5–1.4)
EKG IMPRESSION: NORMAL
EOSINOPHIL # BLD AUTO: 0.18 K/UL (ref 0–0.51)
EOSINOPHIL NFR BLD: 1.8 % (ref 0–6.9)
ERYTHROCYTE [DISTWIDTH] IN BLOOD BY AUTOMATED COUNT: 38.5 FL (ref 35.9–50)
GLUCOSE SERPL-MCNC: 101 MG/DL (ref 65–99)
HCT VFR BLD AUTO: 45.5 % (ref 37–47)
HGB BLD-MCNC: 15.5 G/DL (ref 12–16)
IMM GRANULOCYTES # BLD AUTO: 0.03 K/UL (ref 0–0.11)
IMM GRANULOCYTES NFR BLD AUTO: 0.3 % (ref 0–0.9)
LYMPHOCYTES # BLD AUTO: 4.59 K/UL (ref 1–4.8)
LYMPHOCYTES NFR BLD: 45 % (ref 22–41)
MCH RBC QN AUTO: 31 PG (ref 27–33)
MCHC RBC AUTO-ENTMCNC: 34.1 G/DL (ref 33.6–35)
MCV RBC AUTO: 91 FL (ref 81.4–97.8)
MONOCYTES # BLD AUTO: 0.75 K/UL (ref 0–0.85)
MONOCYTES NFR BLD AUTO: 7.3 % (ref 0–13.4)
NEUTROPHILS # BLD AUTO: 4.61 K/UL (ref 2–7.15)
NEUTROPHILS NFR BLD: 45.1 % (ref 44–72)
NRBC # BLD AUTO: 0 K/UL
NRBC BLD-RTO: 0 /100 WBC
PLATELET # BLD AUTO: 380 K/UL (ref 164–446)
PMV BLD AUTO: 10.3 FL (ref 9–12.9)
POTASSIUM SERPL-SCNC: 4.2 MMOL/L (ref 3.6–5.5)
RBC # BLD AUTO: 5 M/UL (ref 4.2–5.4)
SODIUM SERPL-SCNC: 137 MMOL/L (ref 135–145)
WBC # BLD AUTO: 10.2 K/UL (ref 4.8–10.8)

## 2021-03-21 PROCEDURE — 96374 THER/PROPH/DIAG INJ IV PUSH: CPT

## 2021-03-21 PROCEDURE — 93005 ELECTROCARDIOGRAM TRACING: CPT | Performed by: EMERGENCY MEDICINE

## 2021-03-21 PROCEDURE — 700111 HCHG RX REV CODE 636 W/ 250 OVERRIDE (IP): Performed by: EMERGENCY MEDICINE

## 2021-03-21 PROCEDURE — 80048 BASIC METABOLIC PNL TOTAL CA: CPT

## 2021-03-21 PROCEDURE — 85025 COMPLETE CBC W/AUTO DIFF WBC: CPT

## 2021-03-21 RX ORDER — DIAZEPAM 5 MG/ML
10 INJECTION, SOLUTION INTRAMUSCULAR; INTRAVENOUS ONCE
Status: COMPLETED | OUTPATIENT
Start: 2021-03-21 | End: 2021-03-21

## 2021-03-21 RX ORDER — CHLORDIAZEPOXIDE HYDROCHLORIDE 10 MG/1
10 CAPSULE, GELATIN COATED ORAL 3 TIMES DAILY PRN
Qty: 12 CAPSULE | Refills: 0 | Status: SHIPPED | OUTPATIENT
Start: 2021-03-21 | End: 2021-03-21 | Stop reason: SDUPTHER

## 2021-03-21 RX ORDER — CHLORDIAZEPOXIDE HYDROCHLORIDE 10 MG/1
10 CAPSULE, GELATIN COATED ORAL 3 TIMES DAILY PRN
Qty: 12 CAPSULE | Refills: 0 | Status: SHIPPED | OUTPATIENT
Start: 2021-03-21 | End: 2021-03-26

## 2021-03-21 RX ADMIN — DIAZEPAM 10 MG: 5 INJECTION, SOLUTION INTRAMUSCULAR; INTRAVENOUS at 01:13

## 2021-03-21 ASSESSMENT — FIBROSIS 4 INDEX: FIB4 SCORE: 0.4

## 2021-03-21 ASSESSMENT — PAIN DESCRIPTION - PAIN TYPE: TYPE: ACUTE PAIN

## 2021-03-21 NOTE — ED NOTES
Patient given discharge instructions and electronic prescription. Patient encouraged to follow up with PCP in 1-2 days and has no further questions at this time. PIV removed. VS updated. Patient left with father and ambulated appropriately out of the ED.

## 2021-03-21 NOTE — ED PROVIDER NOTES
ED Provider Note    ER Provider Note         CHIEF COMPLAINT  Chief Complaint   Patient presents with    Seizure     Pt reports had a SZ about half an hour ago       HPI  Rosalba Leavitt is a 25 y.o. female who presents to the Emergency Department evaluation of a possible seizure.  She reports that this happened approximately half an hour ago while she was watching a movie and sitting down.  She reports that she felt lightheaded just before losing consciousness.  Patient says she does not remember what happened but when she came back to she went and found her brother and dad.  She does not have any headache.  The patient has been recently on benzodiazepines and stopped yesterday.  She has been addicted for many years and was trying to get off the medication.  It is unclear how long it took the patient to get back to her baseline however there was a period of altered mental status after the event.    REVIEW OF SYSTEMS  See HPI for further details. All other systems are negative.     PAST MEDICAL HISTORY   has a past medical history of Anxiety, Depression, Depression, and Seizure disorder (HCC).    SURGICAL HISTORY  patient denies any surgical history    SOCIAL HISTORY  Social History     Tobacco Use    Smoking status: Former Smoker     Packs/day: 0.10     Types: Cigarettes    Smokeless tobacco: Never Used   Substance Use Topics    Alcohol use: No    Drug use: Yes     Comment: CBD Oil      Social History     Substance and Sexual Activity   Drug Use Yes    Comment: CBD Oil       FAMILY HISTORY  Family History   Adopted: Yes       CURRENT MEDICATIONS  Home Medications    **Home medications have not yet been reviewed for this encounter**         ALLERGIES  No Known Allergies    PHYSICAL EXAM  VITAL SIGNS: /82   Pulse 84   Temp 36.6 °C (97.9 °F) (Tympanic)   Resp 16   Wt 109 kg (239 lb 3.2 oz)   LMP 02/28/2021 (Exact Date)   SpO2 100%   BMI 42.37 kg/m²      Constitutional: Alert in no apparent  distress.  HENT: No signs of trauma, Bilateral external ears normal, Nose normal.   Eyes: Pupils are equal and reactive, Conjunctiva normal, Non-icteric.   Neck: Normal range of motion, No tenderness, Supple, No stridor.   Lymphatic: No lymphadenopathy noted.   Cardiovascular: Regular rate and rhythm, nondisplaced PMI  Thorax & Lungs: No respiratory distress,  No chest tenderness.   Abdomen: Bowel sounds normal, Soft, No tenderness, No masses, No pulsatile masses. No peritoneal signs.  Skin: Warm, Dry, No erythema, No rash.   Back: No bony tenderness, No CVA tenderness.   Extremities: Intact distal pulses, No edema, No tenderness, No cyanosis.  Musculoskeletal: Good range of motion in all major joints. No tenderness to palpation or major deformities noted.   Neurologic: Alert , Normal motor function, the patient has some tongue fasciculations as well as shakiness in her bilateral upper extremities.  Psychiatric: Affect normal, Judgment normal, Mood normal.     DIAGNOSTIC STUDIES / PROCEDURES    EKG Interpretation:  Interpreted by me  No ST-T wave changes and no ectopy with no Brugada syndrome or any hypertrophic cardiomyopathy and no preexcitation and normal intervals         LABS  Labs Reviewed   CBC WITH DIFFERENTIAL - Abnormal; Notable for the following components:       Result Value    Lymphocytes 45.00 (*)     All other components within normal limits    Narrative:     Release to patient->Immediate   BASIC METABOLIC PANEL - Abnormal; Notable for the following components:    Glucose 101 (*)     All other components within normal limits    Narrative:     Release to patient->Immediate   ESTIMATED GFR    Narrative:     Release to patient->Immediate       All labs reviewed by me.    RADIOLOGY  No orders to display        The radiologist's interpretation of all radiological studies have been reviewed by me.    COURSE & MEDICAL DECISION MAKING  Pertinent Labs & Imaging studies reviewed. (See chart for details)    This  is a 25 y.o. female that presents with what appears to be benzodiazepine withdrawal.  This also could be syncope.  I believe this is a seizure related to withdrawal.  We will get electrolytes as well as CBC to evaluate for anemia and an EKG to evaluate for arrhythmia genic causes of syncope.  We will give the patient Valium to help with her withdrawal symptoms.  We will reassess after this.    12:39 AM - Patient seen and examined at bedside.         The patient was found to have no leukocytosis and no significant anemia.  She has no electrode derangements.  Her EKG is normal.  After a dose of Valium the patient does appear to be no longer in withdrawal.  We will discharge her home with a short course of Librium.  The patient was reevaluated multiple times to assure that she was no longer in withdrawal.  We will have her follow-up with her primary care physician.  We will give her strict return precautions and follow-up.    The total critical care time on this patient is 36 minutes, resuscitating patient, speaking with admitting physician, and deciphering test results. This 36 minutes is exclusive of separately billable procedures.      FINAL IMPRESSION  1. Seizure (HCC)    2. Benzodiazepine dependence (HCC)              Electronically signed by: Magdaleno Aiken M.D., 3/21/2021

## 2021-03-21 NOTE — ED NOTES
Pt denies loss of bowel or bladder.  Pt states felt lightheaded prior to event, denies N/V.  Covid Screen Negative

## 2021-03-21 NOTE — ED TRIAGE NOTES
Chief Complaint   Patient presents with   • Seizure     Pt reports had a SZ about half an hour ago     BP (!) 178/114   Pulse 90   Temp 36.7 °C (98.1 °F) (Temporal)   Resp 16   Wt 109 kg (239 lb 3.2 oz)   LMP 02/28/2021 (Exact Date)   SpO2 97%   BMI 42.37 kg/m²     Pt does not remember event.

## 2021-03-22 NOTE — ED NOTES
Pt noted to have elevated troponin. ERP aware and charge RN aware. Pt moved into room. Pt reports pain has improved after pain medication.    Spoke to pt--reports she had COVID in July 2020 and wants to see if she still has antibodies.  States lab did draw extra blood for this.     Dr. Grove--okay to add COVID antibody testing?

## 2021-03-27 ENCOUNTER — APPOINTMENT (OUTPATIENT)
Dept: RADIOLOGY | Facility: MEDICAL CENTER | Age: 26
End: 2021-03-27
Attending: EMERGENCY MEDICINE
Payer: MEDICAID

## 2021-03-27 ENCOUNTER — HOSPITAL ENCOUNTER (EMERGENCY)
Facility: MEDICAL CENTER | Age: 26
End: 2021-03-27
Attending: EMERGENCY MEDICINE
Payer: MEDICAID

## 2021-03-27 VITALS
OXYGEN SATURATION: 94 % | SYSTOLIC BLOOD PRESSURE: 116 MMHG | RESPIRATION RATE: 16 BRPM | HEART RATE: 96 BPM | TEMPERATURE: 97.4 F | HEIGHT: 63 IN | DIASTOLIC BLOOD PRESSURE: 56 MMHG | WEIGHT: 240.3 LBS | BODY MASS INDEX: 42.58 KG/M2

## 2021-03-27 DIAGNOSIS — R56.9 SEIZURE (HCC): ICD-10-CM

## 2021-03-27 DIAGNOSIS — F13.939 BENZODIAZEPINE WITHDRAWAL WITH COMPLICATION (HCC): ICD-10-CM

## 2021-03-27 LAB
ALBUMIN SERPL BCP-MCNC: 4 G/DL (ref 3.2–4.9)
ALBUMIN/GLOB SERPL: 1.3 G/DL
ALP SERPL-CCNC: 73 U/L (ref 30–99)
ALT SERPL-CCNC: 32 U/L (ref 2–50)
ANION GAP SERPL CALC-SCNC: 12 MMOL/L (ref 7–16)
APPEARANCE UR: CLEAR
AST SERPL-CCNC: 21 U/L (ref 12–45)
BASOPHILS # BLD AUTO: 0.4 % (ref 0–1.8)
BASOPHILS # BLD: 0.05 K/UL (ref 0–0.12)
BILIRUB SERPL-MCNC: <0.2 MG/DL (ref 0.1–1.5)
BILIRUB UR QL STRIP.AUTO: NEGATIVE
BUN SERPL-MCNC: 9 MG/DL (ref 8–22)
CALCIUM SERPL-MCNC: 9.2 MG/DL (ref 8.5–10.5)
CHLORIDE SERPL-SCNC: 102 MMOL/L (ref 96–112)
CO2 SERPL-SCNC: 23 MMOL/L (ref 20–33)
COLOR UR: YELLOW
CREAT SERPL-MCNC: 0.58 MG/DL (ref 0.5–1.4)
EKG IMPRESSION: NORMAL
EOSINOPHIL # BLD AUTO: 0.11 K/UL (ref 0–0.51)
EOSINOPHIL NFR BLD: 1 % (ref 0–6.9)
ERYTHROCYTE [DISTWIDTH] IN BLOOD BY AUTOMATED COUNT: 39.2 FL (ref 35.9–50)
GLOBULIN SER CALC-MCNC: 3.2 G/DL (ref 1.9–3.5)
GLUCOSE SERPL-MCNC: 117 MG/DL (ref 65–99)
GLUCOSE UR STRIP.AUTO-MCNC: NEGATIVE MG/DL
HCT VFR BLD AUTO: 42.8 % (ref 37–47)
HGB BLD-MCNC: 14.3 G/DL (ref 12–16)
IMM GRANULOCYTES # BLD AUTO: 0.04 K/UL (ref 0–0.11)
IMM GRANULOCYTES NFR BLD AUTO: 0.4 % (ref 0–0.9)
KETONES UR STRIP.AUTO-MCNC: NEGATIVE MG/DL
LEUKOCYTE ESTERASE UR QL STRIP.AUTO: NEGATIVE
LYMPHOCYTES # BLD AUTO: 1.99 K/UL (ref 1–4.8)
LYMPHOCYTES NFR BLD: 17.9 % (ref 22–41)
MCH RBC QN AUTO: 30.6 PG (ref 27–33)
MCHC RBC AUTO-ENTMCNC: 33.4 G/DL (ref 33.6–35)
MCV RBC AUTO: 91.6 FL (ref 81.4–97.8)
MICRO URNS: NORMAL
MONOCYTES # BLD AUTO: 0.57 K/UL (ref 0–0.85)
MONOCYTES NFR BLD AUTO: 5.1 % (ref 0–13.4)
NEUTROPHILS # BLD AUTO: 8.38 K/UL (ref 2–7.15)
NEUTROPHILS NFR BLD: 75.2 % (ref 44–72)
NITRITE UR QL STRIP.AUTO: NEGATIVE
NRBC # BLD AUTO: 0 K/UL
NRBC BLD-RTO: 0 /100 WBC
PH UR STRIP.AUTO: 6 [PH] (ref 5–8)
PLATELET # BLD AUTO: 365 K/UL (ref 164–446)
PMV BLD AUTO: 11.1 FL (ref 9–12.9)
POTASSIUM SERPL-SCNC: 4.1 MMOL/L (ref 3.6–5.5)
PROT SERPL-MCNC: 7.2 G/DL (ref 6–8.2)
PROT UR QL STRIP: NEGATIVE MG/DL
RBC # BLD AUTO: 4.67 M/UL (ref 4.2–5.4)
RBC UR QL AUTO: NEGATIVE
SODIUM SERPL-SCNC: 137 MMOL/L (ref 135–145)
SP GR UR STRIP.AUTO: 1.02
UROBILINOGEN UR STRIP.AUTO-MCNC: 0.2 MG/DL
WBC # BLD AUTO: 11.1 K/UL (ref 4.8–10.8)

## 2021-03-27 PROCEDURE — 700102 HCHG RX REV CODE 250 W/ 637 OVERRIDE(OP)

## 2021-03-27 PROCEDURE — 70450 CT HEAD/BRAIN W/O DYE: CPT

## 2021-03-27 PROCEDURE — 99284 EMERGENCY DEPT VISIT MOD MDM: CPT

## 2021-03-27 PROCEDURE — 80053 COMPREHEN METABOLIC PANEL: CPT

## 2021-03-27 PROCEDURE — 700111 HCHG RX REV CODE 636 W/ 250 OVERRIDE (IP)

## 2021-03-27 PROCEDURE — 36415 COLL VENOUS BLD VENIPUNCTURE: CPT

## 2021-03-27 PROCEDURE — A9270 NON-COVERED ITEM OR SERVICE: HCPCS

## 2021-03-27 PROCEDURE — 93005 ELECTROCARDIOGRAM TRACING: CPT

## 2021-03-27 PROCEDURE — 93005 ELECTROCARDIOGRAM TRACING: CPT | Performed by: EMERGENCY MEDICINE

## 2021-03-27 PROCEDURE — 85025 COMPLETE CBC W/AUTO DIFF WBC: CPT

## 2021-03-27 PROCEDURE — 81003 URINALYSIS AUTO W/O SCOPE: CPT

## 2021-03-27 PROCEDURE — 96374 THER/PROPH/DIAG INJ IV PUSH: CPT

## 2021-03-27 RX ORDER — ONDANSETRON 2 MG/ML
4 INJECTION INTRAMUSCULAR; INTRAVENOUS ONCE
Status: COMPLETED | OUTPATIENT
Start: 2021-03-27 | End: 2021-03-27

## 2021-03-27 RX ORDER — ACETAMINOPHEN 325 MG/1
650 TABLET ORAL ONCE
Status: COMPLETED | OUTPATIENT
Start: 2021-03-27 | End: 2021-03-27

## 2021-03-27 RX ADMIN — ONDANSETRON 4 MG: 2 INJECTION INTRAMUSCULAR; INTRAVENOUS at 16:26

## 2021-03-27 RX ADMIN — ACETAMINOPHEN 650 MG: 325 TABLET ORAL at 16:27

## 2021-03-27 ASSESSMENT — PAIN DESCRIPTION - PAIN TYPE: TYPE: ACUTE PAIN

## 2021-03-27 ASSESSMENT — FIBROSIS 4 INDEX: FIB4 SCORE: 0.31

## 2021-03-27 NOTE — ED TRIAGE NOTES
"Chief Complaint   Patient presents with   • Seizure     Patient is currently at [facility] for Klonopin withdrawal/detox. Pt had a \"tonic clonic\" seizure at 1345 for about 5 mins witnessed by the staff. Pt has had seizure before during her withdraw. Patient recieved 3mg ativan IM at [facility]. Patient has not had any further siezure activity.      /64   Pulse (!) 118   Temp 36.3 °C (97.4 °F) (Temporal)   Resp 16   Ht 1.6 m (5' 3\")   Wt 109 kg (240 lb 4.8 oz)   LMP 02/28/2021 (Exact Date)   SpO2 95%   BMI 42.57 kg/m²     Patient was BIB EMS for the above complaint. Pt received 4mg zofran and 250cc of NS. FBS is 104. Patient placed on monitor and chart up for ERP.   "

## 2021-03-27 NOTE — ED PROVIDER NOTES
"ED Provider Note    Scribed for Dr. Valentino Bowen M.D. by Sabina Harris. 3/27/2021  3:19 PM    Primary care provider: Freida Strange M.D.  Means of arrival: EMS  History obtained from: Patient   History limited by: None     CHIEF COMPLAINT  Chief Complaint   Patient presents with    Seizure     Patient is currently at [facility] for Klonopin withdrawal/detox. Pt had a \"tonic clonic\" seizure at 1345 for about 5 mins witnessed by the staff. Pt has had seizure before during her withdraw. Patient recieved 3mg ativan IM at [facility]. Patient has not had any further siezure activity.        HPI  Rosalba Leavitt is a 25 y.o. female who presents to the Emergency Department for evaluation of a seizure that occurred at 1:45 PM today. Patient is currently at [facility] for Klonopin withdrawal/detox. She had a tonic clonic seizure witnessed by staff that lasted about 5 minutes. She received 3 mg of ativan and had not had any more seizure activity. Patient has previously had seizures during her withdrawal. On exam patient states she feels kind of out of breath and confused.    Patient has a history of anxiety. She was seen at [facility] and placed on Klonopin. Prescribing doctor left and patient was placed under care of new doctor. This doctor told patient not to take the Klonopin any more and stopped the prescription. She placed the patient on new medication. Patient had her first seizure shortly after stopping the medication.  She was admitted at [facility] she has had a total of four since stopping her medication. She went to [facility] one week ago to taper off the medication using ativan. Her last dose was yesterday.     REVIEW OF SYSTEMS  Pertinent positives include seizure, confusion, shortness of breath. Pertinent negatives include no further seizure activity. As above, all other systems reviewed and are negative.   See [facility] for further details.     PAST MEDICAL HISTORY   has a past medical " "history of Anxiety, Depression, Depression, and Seizure disorder (HCC).    SURGICAL HISTORY  patient denies any surgical history    SOCIAL HISTORY  Social History     Tobacco Use    Smoking status: Former Smoker     Packs/day: 0.10     Types: Cigarettes    Smokeless tobacco: Never Used   Substance Use Topics    Alcohol use: No    Drug use: Yes     Comment: CBD Oil      Social History     Substance and Sexual Activity   Drug Use Yes    Comment: CBD Oil       FAMILY HISTORY  Family History   Adopted: Yes       CURRENT MEDICATIONS  Home Medications       Reviewed by Cheyanne Call R.N. (Registered Nurse) on 03/27/21 at 1443  Med List Status: Not Addressed     Medication Last Dose Status   acetaminophen (TYLENOL) 500 MG Tab  Active   hydrOXYzine HCl (ATARAX) 25 MG Tab  Active   prazosin (MINIPRESS) 1 MG Cap  Active   traZODone (DESYREL) 50 MG Tab  Active   venlafaxine (EFFEXOR) 75 MG Tab  Active                    ALLERGIES  No Known Allergies    PHYSICAL EXAM  VITAL SIGNS: /64   Pulse (!) 115   Temp 36.3 °C (97.4 °F) (Temporal)   Resp 18   Ht 1.6 m (5' 3\")   Wt 109 kg (240 lb 4.8 oz)   LMP 02/28/2021 (Exact Date)   SpO2 98%   BMI 42.57 kg/m²     Constitutional: Well developed, Well nourished, No distress, Non-toxic appearance. Mildly confused   HENT: Normocephalic, Atraumatic, Bilateral external ears normal, Oropharynx moist, No oral exudates. No tongue trauma.   Eyes: PERRLA, EOMI, Conjunctiva normal, No discharge.   Neck: No tenderness, Supple, No stridor.   Lymphatic: No lymphadenopathy noted.   Cardiovascular: Normal heart rate, Normal rhythm.   Thorax & Lungs: Clear to auscultation bilaterally, No respiratory distress, No wheezing, No crackles.   Abdomen: Soft, No tenderness, No masses, No pulsatile masses.   Skin: Warm, Dry, No erythema, No rash.   Extremities:, No edema No cyanosis.   Musculoskeletal: No tenderness to palpation or major deformities noted.  Intact distal pulses  Neurologic: " Awake, alert. Moves all extremities spontaneously.  Psychiatric: Affect normal, Judgment normal, Mood normal. Mildly confused.     LABS  Results for orders placed or performed during the hospital encounter of 03/27/21   CBC WITH DIFFERENTIAL   Result Value Ref Range    WBC 11.1 (H) 4.8 - 10.8 K/uL    RBC 4.67 4.20 - 5.40 M/uL    Hemoglobin 14.3 12.0 - 16.0 g/dL    Hematocrit 42.8 37.0 - 47.0 %    MCV 91.6 81.4 - 97.8 fL    MCH 30.6 27.0 - 33.0 pg    MCHC 33.4 (L) 33.6 - 35.0 g/dL    RDW 39.2 35.9 - 50.0 fL    Platelet Count 365 164 - 446 K/uL    MPV 11.1 9.0 - 12.9 fL    Neutrophils-Polys 75.20 (H) 44.00 - 72.00 %    Lymphocytes 17.90 (L) 22.00 - 41.00 %    Monocytes 5.10 0.00 - 13.40 %    Eosinophils 1.00 0.00 - 6.90 %    Basophils 0.40 0.00 - 1.80 %    Immature Granulocytes 0.40 0.00 - 0.90 %    Nucleated RBC 0.00 /100 WBC    Neutrophils (Absolute) 8.38 (H) 2.00 - 7.15 K/uL    Lymphs (Absolute) 1.99 1.00 - 4.80 K/uL    Monos (Absolute) 0.57 0.00 - 0.85 K/uL    Eos (Absolute) 0.11 0.00 - 0.51 K/uL    Baso (Absolute) 0.05 0.00 - 0.12 K/uL    Immature Granulocytes (abs) 0.04 0.00 - 0.11 K/uL    NRBC (Absolute) 0.00 K/uL   COMP METABOLIC PANEL   Result Value Ref Range    Sodium 137 135 - 145 mmol/L    Potassium 4.1 3.6 - 5.5 mmol/L    Chloride 102 96 - 112 mmol/L    Co2 23 20 - 33 mmol/L    Anion Gap 12.0 7.0 - 16.0    Glucose 117 (H) 65 - 99 mg/dL    Bun 9 8 - 22 mg/dL    Creatinine 0.58 0.50 - 1.40 mg/dL    Calcium 9.2 8.5 - 10.5 mg/dL    AST(SGOT) 21 12 - 45 U/L    ALT(SGPT) 32 2 - 50 U/L    Alkaline Phosphatase 73 30 - 99 U/L    Total Bilirubin <0.2 0.1 - 1.5 mg/dL    Albumin 4.0 3.2 - 4.9 g/dL    Total Protein 7.2 6.0 - 8.2 g/dL    Globulin 3.2 1.9 - 3.5 g/dL    A-G Ratio 1.3 g/dL   URINALYSIS (UA)    Specimen: Urine   Result Value Ref Range    Color Yellow     Character Clear     Specific Gravity 1.016 <1.035    Ph 6.0 5.0 - 8.0    Glucose Negative Negative mg/dL    Ketones Negative Negative mg/dL    Protein  Negative Negative mg/dL    Bilirubin Negative Negative    Urobilinogen, Urine 0.2 Negative    Nitrite Negative Negative    Leukocyte Esterase Negative Negative    Occult Blood Negative Negative    Micro Urine Req see below    ESTIMATED GFR   Result Value Ref Range    GFR If African American >60 >60 mL/min/1.73 m 2    GFR If Non African American >60 >60 mL/min/1.73 m 2   EKG (NOW)   Result Value Ref Range    Report       [facility] Emergency Dept.    Test Date:  2021  Pt Name:    JANELLE LAU                   Department: ER  MRN:        [id number]                      Room:        21  Gender:     Female                       Technician: [id number]  :        1995                   Requested By:ER TRIAGE PROTOCOL  Order #:    [id number]                    Reading MD:    Measurements  Intervals                                Axis  Rate:       113                          P:          47  TX:         172                          QRS:        27  QRSD:       72                           T:          6  QT:         304  QTc:        417    Interpretive Statements  SINUS TACHYCARDIA  Compared to ECG 2021 00:18:24  Atrial fibrillation no longer present       All labs reviewed by me.    EKG  Interpreted by me, as above.     RADIOLOGY  CT-HEAD W/O   Final Result      No evidence of acute intracranial process.        The radiologist's interpretation of all radiological studies have been reviewed by me.    COURSE & MEDICAL DECISION MAKING  Pertinent Labs & Imaging studies reviewed. (See chart for details)    3:19 PM - Patient seen and examined at bedside. Ordered EKG, CT Head, CBC w/ differential, CMP, Urinalysis to evaluate her symptoms. The differential diagnoses include but are not limited to: seizure, benzodiazapine withdrawal.     3:28 PM Reviewed chart from [facility].     4:24 PM Patient complaining of pain. Ordered Zofran 4 mg, Tylenol 650 mg.    5:01 PM Paged Neurology.     5:02  PM Discussed patient case and medications with Pharmacy.     5:07 PM I discussed the patient's case and the above findings with Dr. Anguiano (Neurology) who consulted on the patient. Explained that some people are more sensitive to benzodiazapine and need longer slower tapers.     5:11 PM Patient was reevaluated at bedside. Discussed lab and radiology results with the patient and informed them that everything came back unconcerning. Explained the information from Neurology and that I will call and see if that is an option from [facility] or where else we can admit the patient to.       6:11 PM [facility] will retake patient. Request information on recommended taper which will send with patient. Patient mother will drive patient back to [facility] after discharge.     Decision Making: This is a 25-year-old female who is having seizures apparently related to benzodiazepine withdrawal.  She had not previously had a CT scan syndrome was performed and is normal she had labs drawn which are not revealing.  This case was discussed with the neurologist who felt that she would just require longer tapering of benzodiazepines.  The patient is recovered from postictal.  At this point I think can be safely discharged back to [facility] where she can be monitored advised slower tapering of benzodiazepines over the next 2 weeks       The [id number] will return for new or worsening symptoms and is stable at the time of discharge.    The patient is referred to a primary physician for blood pressure management, diabetic screening, and for all other preventative health concerns.    DISPOSITION:  Patient will be discharged to [facility].    FOLLOW UP:  No follow-up provider specified.    FINAL IMPRESSION  1. Seizure (HCC)    2. Benzodiazepine withdrawal with complication (HCC)          ISabina (Scribe), am scribing for, and in the presence of, Valentino Bowen M.D..    Electronically signed by: Sabina  Kimberly (Allie), 3/27/2021    IValentino M.D. personally performed the services described in this documentation, as scribed by Sabina Harris in my presence, and it is both accurate and complete.    C    The note accurately reflects work and decisions made by me.  Valentino Bowen M.D.  3/27/2021  9:42 PM

## 2021-03-28 NOTE — ED NOTES
Discharge instructions reviewed and signed with patient. All questions answered at this time. PIV removed by RN. Patient ambulated out of ED with a steady gait. [facility]

## 2021-03-28 NOTE — DISCHARGE INSTRUCTIONS
Recommend restarting benzodiazepine treatment with Ativan a milligram 3 times a day.  Slowly taper over approximately 2 weeks recommendations suggested by neurologist.

## 2021-03-31 ENCOUNTER — APPOINTMENT (OUTPATIENT)
Dept: RADIOLOGY | Facility: MEDICAL CENTER | Age: 26
End: 2021-03-31
Attending: EMERGENCY MEDICINE
Payer: MEDICAID

## 2021-03-31 ENCOUNTER — HOSPITAL ENCOUNTER (EMERGENCY)
Facility: MEDICAL CENTER | Age: 26
End: 2021-03-31
Attending: EMERGENCY MEDICINE
Payer: MEDICAID

## 2021-03-31 VITALS
HEART RATE: 89 BPM | TEMPERATURE: 97.8 F | OXYGEN SATURATION: 98 % | WEIGHT: 235.67 LBS | DIASTOLIC BLOOD PRESSURE: 65 MMHG | SYSTOLIC BLOOD PRESSURE: 126 MMHG | HEIGHT: 63 IN | BODY MASS INDEX: 41.76 KG/M2 | RESPIRATION RATE: 18 BRPM

## 2021-03-31 DIAGNOSIS — F13.20 BENZODIAZEPINE DEPENDENCE (HCC): ICD-10-CM

## 2021-03-31 DIAGNOSIS — R20.2 PARESTHESIA OF LEFT ARM: ICD-10-CM

## 2021-03-31 LAB
ALBUMIN SERPL BCP-MCNC: 4.4 G/DL (ref 3.2–4.9)
ALBUMIN/GLOB SERPL: 1.3 G/DL
ALP SERPL-CCNC: 79 U/L (ref 30–99)
ALT SERPL-CCNC: 26 U/L (ref 2–50)
ANION GAP SERPL CALC-SCNC: 14 MMOL/L (ref 7–16)
AST SERPL-CCNC: 18 U/L (ref 12–45)
BASOPHILS # BLD AUTO: 0.7 % (ref 0–1.8)
BASOPHILS # BLD: 0.06 K/UL (ref 0–0.12)
BILIRUB SERPL-MCNC: 0.3 MG/DL (ref 0.1–1.5)
BUN SERPL-MCNC: 9 MG/DL (ref 8–22)
CALCIUM SERPL-MCNC: 9.5 MG/DL (ref 8.4–10.2)
CHLORIDE SERPL-SCNC: 101 MMOL/L (ref 96–112)
CO2 SERPL-SCNC: 23 MMOL/L (ref 20–33)
CREAT SERPL-MCNC: 0.53 MG/DL (ref 0.5–1.4)
EKG IMPRESSION: NORMAL
EOSINOPHIL # BLD AUTO: 0.09 K/UL (ref 0–0.51)
EOSINOPHIL NFR BLD: 1 % (ref 0–6.9)
ERYTHROCYTE [DISTWIDTH] IN BLOOD BY AUTOMATED COUNT: 38.1 FL (ref 35.9–50)
GLOBULIN SER CALC-MCNC: 3.3 G/DL (ref 1.9–3.5)
GLUCOSE SERPL-MCNC: 89 MG/DL (ref 65–99)
HCG SERPL QL: NEGATIVE
HCT VFR BLD AUTO: 45.7 % (ref 37–47)
HGB BLD-MCNC: 15.5 G/DL (ref 12–16)
IMM GRANULOCYTES # BLD AUTO: 0.03 K/UL (ref 0–0.11)
IMM GRANULOCYTES NFR BLD AUTO: 0.3 % (ref 0–0.9)
LYMPHOCYTES # BLD AUTO: 2.57 K/UL (ref 1–4.8)
LYMPHOCYTES NFR BLD: 28.2 % (ref 22–41)
MAGNESIUM SERPL-MCNC: 1.9 MG/DL (ref 1.5–2.5)
MCH RBC QN AUTO: 30.8 PG (ref 27–33)
MCHC RBC AUTO-ENTMCNC: 33.9 G/DL (ref 33.6–35)
MCV RBC AUTO: 90.9 FL (ref 81.4–97.8)
MONOCYTES # BLD AUTO: 0.54 K/UL (ref 0–0.85)
MONOCYTES NFR BLD AUTO: 5.9 % (ref 0–13.4)
NEUTROPHILS # BLD AUTO: 5.81 K/UL (ref 2–7.15)
NEUTROPHILS NFR BLD: 63.9 % (ref 44–72)
NRBC # BLD AUTO: 0 K/UL
NRBC BLD-RTO: 0 /100 WBC
PLATELET # BLD AUTO: 390 K/UL (ref 164–446)
PMV BLD AUTO: 10.7 FL (ref 9–12.9)
POTASSIUM SERPL-SCNC: 4.1 MMOL/L (ref 3.6–5.5)
PROT SERPL-MCNC: 7.7 G/DL (ref 6–8.2)
RBC # BLD AUTO: 5.03 M/UL (ref 4.2–5.4)
SODIUM SERPL-SCNC: 138 MMOL/L (ref 135–145)
TROPONIN T SERPL-MCNC: <6 NG/L (ref 6–19)
WBC # BLD AUTO: 9.1 K/UL (ref 4.8–10.8)

## 2021-03-31 PROCEDURE — 700102 HCHG RX REV CODE 250 W/ 637 OVERRIDE(OP): Performed by: EMERGENCY MEDICINE

## 2021-03-31 PROCEDURE — 99284 EMERGENCY DEPT VISIT MOD MDM: CPT

## 2021-03-31 PROCEDURE — 83735 ASSAY OF MAGNESIUM: CPT

## 2021-03-31 PROCEDURE — 84703 CHORIONIC GONADOTROPIN ASSAY: CPT

## 2021-03-31 PROCEDURE — 700111 HCHG RX REV CODE 636 W/ 250 OVERRIDE (IP): Performed by: EMERGENCY MEDICINE

## 2021-03-31 PROCEDURE — 93005 ELECTROCARDIOGRAM TRACING: CPT | Performed by: EMERGENCY MEDICINE

## 2021-03-31 PROCEDURE — 84484 ASSAY OF TROPONIN QUANT: CPT

## 2021-03-31 PROCEDURE — 80053 COMPREHEN METABOLIC PANEL: CPT

## 2021-03-31 PROCEDURE — 94760 N-INVAS EAR/PLS OXIMETRY 1: CPT

## 2021-03-31 PROCEDURE — 36415 COLL VENOUS BLD VENIPUNCTURE: CPT

## 2021-03-31 PROCEDURE — 85025 COMPLETE CBC W/AUTO DIFF WBC: CPT

## 2021-03-31 PROCEDURE — 70450 CT HEAD/BRAIN W/O DYE: CPT

## 2021-03-31 PROCEDURE — A9270 NON-COVERED ITEM OR SERVICE: HCPCS | Performed by: EMERGENCY MEDICINE

## 2021-03-31 PROCEDURE — 96374 THER/PROPH/DIAG INJ IV PUSH: CPT

## 2021-03-31 RX ORDER — CHLORDIAZEPOXIDE HYDROCHLORIDE 10 MG/1
10 CAPSULE, GELATIN COATED ORAL 3 TIMES DAILY
Status: SHIPPED | COMMUNITY
End: 2021-03-31

## 2021-03-31 RX ORDER — BUPROPION HYDROCHLORIDE 150 MG/1
150 TABLET ORAL EVERY MORNING
Status: SHIPPED | COMMUNITY
End: 2023-08-01

## 2021-03-31 RX ORDER — VENLAFAXINE HYDROCHLORIDE 150 MG/1
150 CAPSULE, EXTENDED RELEASE ORAL DAILY
COMMUNITY

## 2021-03-31 RX ORDER — ONDANSETRON 2 MG/ML
4 INJECTION INTRAMUSCULAR; INTRAVENOUS ONCE
Status: COMPLETED | OUTPATIENT
Start: 2021-03-31 | End: 2021-03-31

## 2021-03-31 RX ORDER — GABAPENTIN 400 MG/1
400 CAPSULE ORAL ONCE
Status: COMPLETED | OUTPATIENT
Start: 2021-03-31 | End: 2021-03-31

## 2021-03-31 RX ORDER — LORAZEPAM 1 MG/1
1 TABLET ORAL 2 TIMES DAILY
Status: SHIPPED | COMMUNITY
End: 2023-08-01

## 2021-03-31 RX ORDER — GABAPENTIN 400 MG/1
400 CAPSULE ORAL 3 TIMES DAILY
COMMUNITY

## 2021-03-31 RX ADMIN — ONDANSETRON 4 MG: 2 INJECTION INTRAMUSCULAR; INTRAVENOUS at 14:36

## 2021-03-31 RX ADMIN — GABAPENTIN 400 MG: 400 CAPSULE ORAL at 14:35

## 2021-03-31 ASSESSMENT — FIBROSIS 4 INDEX: FIB4 SCORE: 0.24

## 2021-03-31 NOTE — ED NOTES
Pharmacy Medication Reconciliation      Medication reconciliation updated and complete per pt at bedside & pt home pharmacy  Allergies have been verified   No oral ABX within the last 14 days  Patient home pharmacy:[facility]    Pt reports she has not had any Clonazepam in about 3 weeks

## 2021-03-31 NOTE — ED NOTES
"Pt c/o \"I feel like Im going to have a seizure', pt placed in w/c near triage desk, charge nurse aware. Pt and mom updated re: poc incl awaiting bed assignment  "

## 2021-03-31 NOTE — DISCHARGE INSTRUCTIONS
In the emergency department if you have a seizure.  Please avoid any activity where if you had a seizure he would hurt yourself or others such as driving, swimming, baths,  or riding bikes.

## 2021-03-31 NOTE — ED NOTES
Pt amb to triage c/o medication withdrawal for clonzepam 3mg po TID >4yrs. Pt stopped taking clonazepam x1wk ago and started on ativan 1mg po BID per [facility], pt was an in-pt d/c'd to home from [facility] yesterday. Pt states she 'feels like she is going to have a seizure'. Pt instr to stay near nurses triage desk until room assigned. Pt verbalized understanding.

## 2021-03-31 NOTE — ED NOTES
ERP at bedside. Pt agrees with plan of care discussed by ERP. AIDET acknowledged with patient. Marino in low position, side rail up for pt safety. Call light within reach. Will continue to monitor.

## 2021-03-31 NOTE — ED PROVIDER NOTES
ED Provider Note    CHIEF COMPLAINT  Chief Complaint   Patient presents with    Drug Withdrawal     last dose of ativan 1mg po yesterday at 0830       HPI  Rosalba Leavitt is a 25 y.o. female who presents feeling like she is having benzodiazepine withdrawal.  Patient is scared she may have a seizure.  [facility] being weaned off Klonopin which she had been on for 4 years.  Patient states that she had a seizure 4 days ago and was actually seen here in the emergency department.  Since that point time [facility] her her last dose of Ativan yesterday.  She states that she woke up feeling shaky today.  She states she does not have any known history of seizure disorder but she is concerned she may have a seizure because she is feeling shaky today.  She denies any headache, fever, chills, nausea, vomiting, diarrhea.  She does states she has a numbness sensation of her left cheek and left upper extremity.  She denies any weakness that is unilateral.  She denies any vision changes or changes in speech.  Patient does state that they started on several new medications at [facility].    She states the only time she has had seizures before or when she has been taken off the Klonopin.  She states that she has never had a formal EEG or been diagnosed with a seizure disorder.    REVIEW OF SYSTEMS  Pertinent positives include left-sided facial numbness, left upper extremity numbness, generalized shakiness. Pertinent negatives include fever, chills, shortness of breath, cough, chest pain, unilateral weakness, difficulty speaking, vision changes or known seizure disorder. All other systems negative    PAST MEDICAL HISTORY   has a past medical history of Anxiety, Depression, Depression, and Seizure disorder (HCC).    SOCIAL HISTORY  Social History     Tobacco Use    Smoking status: Former Smoker  "    Packs/day: 0.10     Types: Cigarettes    Smokeless tobacco: Never Used   Substance and Sexual Activity    Alcohol use: No    Drug use: Yes     Comment: CBD Oil    Sexual activity: Never       SURGICAL HISTORY  patient denies any surgical history    CURRENT MEDICATIONS  Home Medications       Reviewed by Rosalie Jefferson (Pharmacy Tech) on 03/31/21 at 1539  Med List Status: Complete     Medication Last Dose Status   buPROPion (WELLBUTRIN XL) 150 MG XL tablet 3/21/2021 Active   gabapentin (NEURONTIN) 400 MG Cap 3/31/2021 Active   hydrOXYzine HCl (ATARAX) 25 MG Tab 3/21/2021 Active   LORazepam (ATIVAN) 1 MG Tab 3/30/2021 Active   Multiple Vitamin (MULTIVITAMIN PO) 3/30/2021 Active   prazosin (MINIPRESS) 1 MG Cap 3/30/2021 Active   traZODone (DESYREL) 50 MG Tab 3/30/2021 Active   venlafaxine (EFFEXOR-XR) 150 MG extended-release capsule 3/31/2021 Active                    ALLERGIES  No Known Allergies    PHYSICAL EXAM  VITAL SIGNS: /65   Pulse 89   Temp 36.6 °C (97.8 °F) (Temporal)   Resp 18   Ht 1.6 m (5' 3\")   Wt 107 kg (235 lb 10.8 oz)   SpO2 98%   BMI 41.75 kg/m²   Constitutional: Well developed, Well nourished, mild distress.   HENT: Normocephalic, Atraumatic, no tremor to the tongue oropharynx moist, No oral exudates.  Mask otherwise in place no facial droop.  Patient reports decreased sensation in the left side of the face  Eyes: Conjunctiva normal, No discharge.   Neck: Supple, No stridor, no no meningismus  Cardiovascular: Normal heart rate, Normal rhythm, No murmurs, equal pulses.   Pulmonary: Normal breath sounds, No respiratory distress, No wheezing, No rales, No rhonchi.  Chest: No chest wall tenderness or deformity.   Abdomen:Soft, No tenderness, No masses, no rebound, no guarding.   Musculoskeletal: No major deformities noted, No tenderness.   Skin: Warm, Dry, No erythema, No rash.   Neurologic: Alert & oriented x 3, Normal motor function,  No focal deficits noted.  Patient reports " generalized decreased touch of left upper extremity.Normal finger to nose, Normal cranial nerves II-XII, No pronator drift. Equal strength in upper and lower extremities bilaterally.  Psychiatric: Affect normal, Judgment normal, Mood normal.       EKG  As below    RADIOLOGY/PROCEDURES  CT-HEAD W/O   Final Result      No CT evidence of acute infarct, hemorrhage or mass.          Laboratory tests  Results for orders placed or performed during the hospital encounter of 03/31/21   CBC WITH DIFFERENTIAL   Result Value Ref Range    WBC 9.1 4.8 - 10.8 K/uL    RBC 5.03 4.20 - 5.40 M/uL    Hemoglobin 15.5 12.0 - 16.0 g/dL    Hematocrit 45.7 37.0 - 47.0 %    MCV 90.9 81.4 - 97.8 fL    MCH 30.8 27.0 - 33.0 pg    MCHC 33.9 33.6 - 35.0 g/dL    RDW 38.1 35.9 - 50.0 fL    Platelet Count 390 164 - 446 K/uL    MPV 10.7 9.0 - 12.9 fL    Neutrophils-Polys 63.90 44.00 - 72.00 %    Lymphocytes 28.20 22.00 - 41.00 %    Monocytes 5.90 0.00 - 13.40 %    Eosinophils 1.00 0.00 - 6.90 %    Basophils 0.70 0.00 - 1.80 %    Immature Granulocytes 0.30 0.00 - 0.90 %    Nucleated RBC 0.00 /100 WBC    Neutrophils (Absolute) 5.81 2.00 - 7.15 K/uL    Lymphs (Absolute) 2.57 1.00 - 4.80 K/uL    Monos (Absolute) 0.54 0.00 - 0.85 K/uL    Eos (Absolute) 0.09 0.00 - 0.51 K/uL    Baso (Absolute) 0.06 0.00 - 0.12 K/uL    Immature Granulocytes (abs) 0.03 0.00 - 0.11 K/uL    NRBC (Absolute) 0.00 K/uL   COMP METABOLIC PANEL   Result Value Ref Range    Sodium 138 135 - 145 mmol/L    Potassium 4.1 3.6 - 5.5 mmol/L    Chloride 101 96 - 112 mmol/L    Co2 23 20 - 33 mmol/L    Anion Gap 14.0 7.0 - 16.0    Glucose 89 65 - 99 mg/dL    Bun 9 8 - 22 mg/dL    Creatinine 0.53 0.50 - 1.40 mg/dL    Calcium 9.5 8.4 - 10.2 mg/dL    AST(SGOT) 18 12 - 45 U/L    ALT(SGPT) 26 2 - 50 U/L    Alkaline Phosphatase 79 30 - 99 U/L    Total Bilirubin 0.3 0.1 - 1.5 mg/dL    Albumin 4.4 3.2 - 4.9 g/dL    Total Protein 7.7 6.0 - 8.2 g/dL    Globulin 3.3 1.9 - 3.5 g/dL    A-G Ratio 1.3 g/dL    HCG QUAL SERUM   Result Value Ref Range    Beta-Hcg Qualitative Serum Negative Negative   TROPONIN   Result Value Ref Range    Troponin T <6 6 - 19 ng/L   MAGNESIUM   Result Value Ref Range    Magnesium 1.9 1.5 - 2.5 mg/dL   ESTIMATED GFR   Result Value Ref Range    GFR If African American >60 >60 mL/min/1.73 m 2    GFR If Non African American >60 >60 mL/min/1.73 m 2   EKG (NOW)   Result Value Ref Range    Report       [facility] Emergency Dept.    Test Date:  2021  Pt Name:    JANELLE LAU                   Department: [facility]  MRN:        [id number]                      [facility]  Gender:     Female                       Technician: [id number]  :        1995                   Requested By:DWIGHT UGARTE  Order #:    [id number]                    Reading MD: DWIGHT UGARTE MD    Measurements  Intervals                                Axis  Rate:       94                           P:          49  NH:         154                          QRS:        41  QRSD:       81                           T:          23  QT:         348  QTc:        436    Interpretive Statements  Sinus rhythm, rate of 94, normal axis  Low voltage, precordial leads  Compared to ECG 2021 14:51:53  Low QRS voltage now present  Sinus tachycardia no longer present  Electronically Signed On 3- 16:34:18 PDT by DWIGHT UGARTE MD           Medications given in the ER  Medications   ondansetron (ZOFRAN) syringe/vial injection 4 mg (4 mg Intravenous Given 3/31/21 1436)   gabapentin (NEURONTIN) capsule 400 mg (400 mg Oral Given 3/31/21 1435)       COURSE & MEDICAL DECISION MAKING  Defer differential includes benzodiazepine withdrawal, electrolyte stroke benzodiazepine dependence    I verified that the patient was wearing a mask and I was wearing appropriate PPE every time I entered the room. The patient's mask was on the patient at all times during my encounter except for a brief view  of the oropharynx.    Had a long conversation with the patient and her mother about the fact that [facility] has [facility] to be [facility] off her benzodiazepines.  I discussed that the patient did not show any active signs of withdrawal secondary to the fact that she was actually not tremulous, not tachycardic, or hypotensive at the time.  I discussed with them that she probably is still having some cravings for benzodiazepines since she has been on them for such a long period time but I did not want to start her back on them since she had spent so much time being weaned off them.  Patient does not have any known seizure disorder I have discussed this with the patient and her mother.  The only time she had had seizures but her when she had been abruptly stopped on her benzodiazepines previously.    Patient was reexamined after CT.  She states her numbness is completely gone.  At this point time since she only had numbness I do not think the patient has had a stroke especially given the fact that is gone away completely.  I discussed this with the patient.  At this point in time we will discharge her home.        Medical decision making: At this point time I think the patient feels shaky because she has benzodiazepine dependence.  She has been weaned off at a medical facility over the last 5 to 6 days.  She does not actively show any signs of benzodiazepine withdrawal she is not tachycardic, not hypertensive or having an active tremor.  Patient did complain of some bilateral face numbness as well as numbness in the left arm for over 6 hours.  CT of the head was done and does not show any active intracranial hemorrhage, mass or signs of stroke.  Her symptoms had completely resolved on reexamination I do not think this is a TIA since it was only subjective tingling or numbness which is completely resolved.  At this point time I think the patient can be discharged home.  I did give her instructions about  avoiding situations where if she did have a seizures she would be injured or injure someone else.  Again the patient has no diagnosed history of seizures and only seizures that she has had in the past or when she abruptly stopped benzodiazepines.       The patient will return for new or worsening symptoms and is stable at the time of discharge.    The patient is referred to a primary physician for blood pressure management, diabetic screening, and for all other preventative health concerns.      DISPOSITION:  Patient will be discharged home in stable condition.    FOLLOW UP:  Your doctor    Schedule an appointment as soon as possible for a visit in 3 days      [address] [address] [phone]    If you need a doctor    [address] [address] [phone]    If you need a doctor      OUTPATIENT MEDICATIONS:  Discharge Medication List as of 3/31/2021  4:52 PM            FINAL IMPRESSION  1. Benzodiazepine dependence (HCC)    2. Paresthesia of left arm               Electronically signed by: Jeovany Dawson M.D., 3/31/2021 12:53 PM

## 2021-04-02 ENCOUNTER — HOSPITAL ENCOUNTER (EMERGENCY)
Facility: MEDICAL CENTER | Age: 26
End: 2021-04-02
Attending: EMERGENCY MEDICINE | Admitting: EMERGENCY MEDICINE
Payer: MEDICAID

## 2021-04-02 VITALS
OXYGEN SATURATION: 97 % | SYSTOLIC BLOOD PRESSURE: 145 MMHG | HEART RATE: 96 BPM | BODY MASS INDEX: 41.88 KG/M2 | HEIGHT: 63 IN | RESPIRATION RATE: 15 BRPM | DIASTOLIC BLOOD PRESSURE: 98 MMHG | TEMPERATURE: 97.2 F | WEIGHT: 236.33 LBS

## 2021-04-02 DIAGNOSIS — F41.0 PANIC ATTACK: ICD-10-CM

## 2021-04-02 LAB
AMPHET UR QL SCN: NEGATIVE
BARBITURATES UR QL SCN: NEGATIVE
BENZODIAZ UR QL SCN: NEGATIVE
BZE UR QL SCN: NEGATIVE
CANNABINOIDS UR QL SCN: NEGATIVE
HCG UR QL: NEGATIVE
METHADONE UR QL SCN: NEGATIVE
OPIATES UR QL SCN: NEGATIVE
OXYCODONE UR QL SCN: NEGATIVE
PCP UR QL SCN: NEGATIVE
PROPOXYPH UR QL SCN: NEGATIVE

## 2021-04-02 PROCEDURE — A9270 NON-COVERED ITEM OR SERVICE: HCPCS | Performed by: EMERGENCY MEDICINE

## 2021-04-02 PROCEDURE — 99284 EMERGENCY DEPT VISIT MOD MDM: CPT

## 2021-04-02 PROCEDURE — 700102 HCHG RX REV CODE 250 W/ 637 OVERRIDE(OP): Performed by: EMERGENCY MEDICINE

## 2021-04-02 PROCEDURE — 80307 DRUG TEST PRSMV CHEM ANLYZR: CPT

## 2021-04-02 PROCEDURE — 81025 URINE PREGNANCY TEST: CPT

## 2021-04-02 RX ORDER — LORAZEPAM 1 MG/1
1 TABLET ORAL ONCE
Status: COMPLETED | OUTPATIENT
Start: 2021-04-02 | End: 2021-04-02

## 2021-04-02 RX ADMIN — LORAZEPAM 1 MG: 1 TABLET ORAL at 17:55

## 2021-04-02 ASSESSMENT — FIBROSIS 4 INDEX: FIB4 SCORE: 0.22

## 2021-04-03 NOTE — ED NOTES
Pt provided with discharge paper work and follow up instructions. Pt declines any questions at this time. Pt ambulated out of ER with mother.

## 2021-04-03 NOTE — ED PROVIDER NOTES
"ED Provider Note    ED Provider Note    Scribed for Dorie Hernandez MD by Doire Hernandez M.D.. 4/2/2021, 5:42 PM.    Primary care provider: No primary care provider on file.  Means of arrival: Private  History obtained from: Patient  History limited by: None    CHIEF COMPLAINT  Chief Complaint   Patient presents with    Anxiety     Pt BIB EMS to triage. Pt reports having a panic attack at home, stating tingling in both arms, difficulty taking a full breath. No facial droop or unilateral weakness. Pt AAOx4.        HPI  Rosalab Leavitt is a 25 y.o. female who presents to the Emergency Department for evaluation of acute anxiety.  Patient relates she had symptoms today a couple hours prior to arrival similar to previous panic attacks.  She describes hyperventilation, sensation of tightness to her chest, anxiety, tremulousness, and tingling sensation to both of her arms.  She arrives by EMS and is feeling better at this time but still very anxious.  She tells me symptoms felt as if \"I might have a seizure\", which she has no established seizure disorder does not describe any convulsive movements nor anything consistent with a seizure today.  She has been taken off of benzodiazepines by [facility] her established psychiatry service last month.  She is still taking Wellbutrin and Atarax at home reportedly.    REVIEW OF SYSTEMS  Pertinent positives include anxiety, tremulousness, hyperventilation. Pertinent negatives include no suicidal ideation, no focal weakness nor numbness.  All other systems reviewed and negative.    PAST MEDICAL HISTORY   has a past medical history of Anxiety, Depression, Depression, and Seizure disorder (HCC).    SURGICAL HISTORY  patient denies any surgical history    SOCIAL HISTORY  Social History     Tobacco Use    Smoking status: Former Smoker     Packs/day: 0.10     Types: Cigarettes    Smokeless tobacco: Never Used   Substance Use Topics    Alcohol use: No    Drug " "use: Yes     Comment: CBD Oil      Social History     Substance and Sexual Activity   Drug Use Yes    Comment: CBD Oil       FAMILY HISTORY  Family History   Adopted: Yes       CURRENT MEDICATIONS  Home Medications    **Home medications have not yet been reviewed for this encounter**         ALLERGIES  No Known Allergies    PHYSICAL EXAM  VITAL SIGNS: /98   Pulse 96   Temp 36.2 °C (97.2 °F) (Temporal)   Resp 15   Ht 1.6 m (5' 3\")   Wt 107 kg (236 lb 5.3 oz)   LMP 03/28/2021   SpO2 97%   Breastfeeding No   BMI 41.86 kg/m²     General: Alert, no acute distress, very anxious  Skin: Warm, dry, normal for ethnicity  Head: Normocephalic, atraumatic  Neck: Trachea midline, no tenderness  Eye: PERRL, normal conjunctiva, extraocular movements intact without nystagmus  ENMT: Oral mucosa moist, no pharyngeal erythema or exudate  Cardiovascular: S1, S2, mildly tachycardic, otherwise regular rate and rhythm, No murmur, Normal peripheral perfusion  Respiratory: Lungs CTA, respirations are non-labored, breath sounds are equal  Musculoskeletal: No swelling, no deformity  Neurological: Alert and oriented to person, place, time, and situation.  Cranial nerves II through XII grossly intact, no pronator drift, upper and lower extremity strength and sensation are 5 x 5 and symmetrical bilaterally.  2+ patellar reflexes  Lymphatics: No lymphadenopathy  Psychiatric: Cooperative, very anxious mood with mood congruent affect      DIAGNOSTIC STUDIES/PROCEDURES    LABS  Results for orders placed or performed during the hospital encounter of 04/02/21   URINE DRUG SCREEN   Result Value Ref Range    Amphetamines Urine Negative Negative    Barbiturates Negative Negative    Benzodiazepines Negative Negative    Cocaine Metabolite Negative Negative    Methadone Negative Negative    Opiates Negative Negative    Oxycodone Negative Negative    Phencyclidine -Pcp Negative Negative    Propoxyphene Negative Negative    Cannabinoid Metab " "Negative Negative   BETA-HCG QUALITATIVE URINE   Result Value Ref Range    Beta-Hcg Urine Negative Negative      All labs reviewed by me.        COURSE & MEDICAL DECISION MAKING  Pertinent Labs & Imaging studies reviewed. (See chart for details)    5:42 PM - Patient seen and examined at bedside. Patient will be treated with lorazepam 1 mg p.o. Ordered urine tox and urine hCG to evaluate her symptoms. The differential diagnoses include but are not limited to: Panic attack, acute anxiety    Patient Vitals for the past 24 hrs:   BP Temp Temp src Pulse Resp SpO2 Height Weight   04/02/21 1835 145/98 36.2 °C (97.2 °F) Temporal 96 15 97 % -- --   04/02/21 1622 -- 36.8 °C (98.3 °F) Temporal -- -- -- -- --   04/02/21 1618 147/115 -- -- (!) 108 17 94 % -- --   04/02/21 1616 -- -- -- -- -- -- 1.6 m (5' 3\") 107 kg (236 lb 5.3 oz)      HTN/IDDM FOLLOW UP:  The patient is referred to a primary physician for blood pressure management, diabetic screening, and for all other preventive health concerns     Decision Making:  This is a 25 y.o. year old female who presents with symptoms consistent with panic attack including hyperventilation, anxiety, tremulousness.  She is well-appearing, no evidence by history or exam of an actual seizure.  She has an unremarkable neurologic examination, NIH stroke scale is 0.  Clear lungs and mild tachycardia, otherwise benign presentation, consistent with acute anxiety.  She is already written for chronic medications, I do think is reasonable to treat with single dose of benzodiazepine today, ordered Ativan for her.    The patient will return for new or worsening symptoms and is stable at the time of discharge.    Patient has had high blood pressure while in the emergency department, felt likely secondary to medical condition. Counseled patient to monitor blood pressure at home and follow up with primary care physician.     DISPOSITION:  Patient will be discharged home in stable condition.    FOLLOW " UP:  [facility]  [address] [facility] [id number] [phone]  Schedule an appointment as soon as possible for a visit       OUTPATIENT MEDICATIONS:  Discharge Medication List as of 4/2/2021  6:24 PM             FINAL IMPRESSION  1. Panic attack          Dorie SEXTON M.D. (Scribe), am scribing for, and in the presence of, Dorie Hernandez MD.    Electronically signed by: Dorie Hernandez M.D. (Scribe), 4/2/2021    Dorie SEXTON MD personally performed the services described in this documentation, as scribed by Dorie Hernandez M.D. in my presence, and it is both accurate and complete    The note accurately reflects work and decisions made by me.  Dorie Hernandez M.D.  4/2/2021  8:08 PM

## 2023-08-01 ENCOUNTER — HOSPITAL ENCOUNTER (EMERGENCY)
Facility: MEDICAL CENTER | Age: 28
End: 2023-08-01
Attending: EMERGENCY MEDICINE
Payer: MEDICAID

## 2023-08-01 ENCOUNTER — APPOINTMENT (OUTPATIENT)
Dept: RADIOLOGY | Facility: MEDICAL CENTER | Age: 28
End: 2023-08-01
Attending: EMERGENCY MEDICINE
Payer: MEDICAID

## 2023-08-01 VITALS
BODY MASS INDEX: 43.91 KG/M2 | TEMPERATURE: 98.5 F | HEART RATE: 96 BPM | SYSTOLIC BLOOD PRESSURE: 141 MMHG | DIASTOLIC BLOOD PRESSURE: 89 MMHG | HEIGHT: 63 IN | WEIGHT: 247.8 LBS | RESPIRATION RATE: 16 BRPM | OXYGEN SATURATION: 96 %

## 2023-08-01 DIAGNOSIS — R11.0 NAUSEA: ICD-10-CM

## 2023-08-01 DIAGNOSIS — J02.9 VIRAL PHARYNGITIS: ICD-10-CM

## 2023-08-01 DIAGNOSIS — B34.9 VIRAL SYNDROME: ICD-10-CM

## 2023-08-01 DIAGNOSIS — J02.9 SORE THROAT: ICD-10-CM

## 2023-08-01 LAB
FLUAV RNA SPEC QL NAA+PROBE: NEGATIVE
FLUBV RNA SPEC QL NAA+PROBE: NEGATIVE
RSV RNA SPEC QL NAA+PROBE: NEGATIVE
S PYO DNA SPEC NAA+PROBE: NOT DETECTED
SARS-COV-2 RNA RESP QL NAA+PROBE: NOTDETECTED
SPECIMEN SOURCE: NORMAL

## 2023-08-01 PROCEDURE — 700102 HCHG RX REV CODE 250 W/ 637 OVERRIDE(OP): Performed by: EMERGENCY MEDICINE

## 2023-08-01 PROCEDURE — 87651 STREP A DNA AMP PROBE: CPT

## 2023-08-01 PROCEDURE — A9270 NON-COVERED ITEM OR SERVICE: HCPCS | Performed by: EMERGENCY MEDICINE

## 2023-08-01 PROCEDURE — 0241U HCHG SARS-COV-2 COVID-19 NFCT DS RESP RNA 4 TRGT MIC: CPT

## 2023-08-01 PROCEDURE — 99283 EMERGENCY DEPT VISIT LOW MDM: CPT

## 2023-08-01 PROCEDURE — 700111 HCHG RX REV CODE 636 W/ 250 OVERRIDE (IP): Performed by: EMERGENCY MEDICINE

## 2023-08-01 PROCEDURE — C9803 HOPD COVID-19 SPEC COLLECT: HCPCS | Performed by: EMERGENCY MEDICINE

## 2023-08-01 PROCEDURE — 71045 X-RAY EXAM CHEST 1 VIEW: CPT

## 2023-08-01 RX ORDER — DEXAMETHASONE SODIUM PHOSPHATE 10 MG/ML
16 INJECTION, SOLUTION INTRAMUSCULAR; INTRAVENOUS ONCE
Status: COMPLETED | OUTPATIENT
Start: 2023-08-01 | End: 2023-08-01

## 2023-08-01 RX ORDER — GUAIFENESIN 600 MG/1
600 TABLET, EXTENDED RELEASE ORAL 2 TIMES DAILY PRN
COMMUNITY

## 2023-08-01 RX ORDER — MIRTAZAPINE 15 MG/1
15 TABLET, FILM COATED ORAL EVERY EVENING
COMMUNITY

## 2023-08-01 RX ORDER — NAPROXEN 250 MG/1
500 TABLET ORAL ONCE
Status: COMPLETED | OUTPATIENT
Start: 2023-08-01 | End: 2023-08-01

## 2023-08-01 RX ORDER — ACETAMINOPHEN 500 MG
500 TABLET ORAL ONCE
COMMUNITY

## 2023-08-01 RX ORDER — NAPROXEN 500 MG/1
500 TABLET ORAL
Qty: 30 TABLET | Refills: 0 | Status: SHIPPED | OUTPATIENT
Start: 2023-08-01

## 2023-08-01 RX ORDER — ONDANSETRON 4 MG/1
4 TABLET, ORALLY DISINTEGRATING ORAL ONCE
Status: COMPLETED | OUTPATIENT
Start: 2023-08-01 | End: 2023-08-01

## 2023-08-01 RX ORDER — ONDANSETRON 4 MG/1
4 TABLET, ORALLY DISINTEGRATING ORAL EVERY 6 HOURS PRN
Qty: 20 TABLET | Refills: 0 | Status: SHIPPED | OUTPATIENT
Start: 2023-08-01

## 2023-08-01 RX ADMIN — NAPROXEN 500 MG: 250 TABLET ORAL at 13:54

## 2023-08-01 RX ADMIN — ONDANSETRON 4 MG: 4 TABLET, ORALLY DISINTEGRATING ORAL at 13:54

## 2023-08-01 RX ADMIN — DEXAMETHASONE SODIUM PHOSPHATE 16 MG: 10 INJECTION, SOLUTION INTRAMUSCULAR; INTRAVENOUS at 13:54

## 2023-08-01 NOTE — ED PROVIDER NOTES
"ED Provider Note    Primary care provider: No primary care provider on file.  Means of arrival: Private vehicle  History obtained from: patient  History limited by: none    CHIEF COMPLAINT  Chief Complaint   Patient presents with    Sore Throat     For the last 4 days    Headache     The last 4 days    Cough     The last 4 days    N/V     Vomiting this am \"a few time this morning\"     Congestion     Started 4 days ago     Fever     The last 4 days  last night 100.4        HPI/ROS  Rosalba Leavitt is a 28 y.o. female who presents to the Emergency Department to the evaluation for sore throat, headache, cough, nausea and vomiting.  Patient reports that her mother was recently ill with similar symptoms.  Then 4 days ago she developed cough, generalized body aches, headache, sore throat, congestion.  She reports associated fever up to 100.4 last night.  She has not taken anything for her discomfort today.  She reports multiple episodes of vomiting this morning.  Denies abdominal pain, chest pain or shortness of breath.  She is otherwise generally healthy.  Denies chance of pregnancy.    EXTERNAL RECORDS REVIEWED  Patient has history of anxiety and seizures per record review.    LIMITATION TO HISTORY   none    OUTSIDE HISTORIAN(S):  none      PAST MEDICAL HISTORY   has a past medical history of Anxiety, Depression, Depression, and Seizure disorder (HCC).    SURGICAL HISTORY  patient denies any surgical history    SOCIAL HISTORY  Social History     Tobacco Use    Smoking status: Every Day     Packs/day: 0.10     Types: Cigarettes    Smokeless tobacco: Never   Vaping Use    Vaping Use: Never used   Substance Use Topics    Alcohol use: No    Drug use: Not Currently      Social History     Substance and Sexual Activity   Drug Use Not Currently       FAMILY HISTORY  Family History   Adopted: Yes       CURRENT MEDICATIONS  Home Medications       Reviewed by Rosalie Randall (Pharmacy Tech) on 08/01/23 at 1423  Med " "List Status: Complete     Medication Last Dose Status   acetaminophen (TYLENOL) 500 MG Tab 7/31/2023 Active   gabapentin (NEURONTIN) 400 MG Cap 8/1/2023 Active   guaiFENesin ER (MUCINEX) 600 MG TABLET SR 12 HR 8/1/2023 Active   mirtazapine (REMERON) 15 MG Tab 7/31/2023 Active   prazosin (MINIPRESS) 1 MG Cap 7/31/2023 Active   venlafaxine (EFFEXOR-XR) 150 MG extended-release capsule 8/1/2023 Active                    ALLERGIES  No Known Allergies    PHYSICAL EXAM  VITAL SIGNS: BP (!) 146/98   Pulse 99   Temp 36.9 °C (98.5 °F) (Temporal)   Resp 18   Ht 1.6 m (5' 3\")   Wt 112 kg (247 lb 12.8 oz)   LMP 07/23/2023 (Approximate)   SpO2 94%   BMI 43.90 kg/m²   Vitals reviewed by myself.  Physical Exam  Nursing note and vitals reviewed.  Constitutional: Well-developed and well-nourished.   HENT: Head is normocephalic and atraumatic.  Bilateral TMs are nonerythematous.  Posterior oropharynx is pink without exudates.  Patient has moist mucous membranes.  Eyes: extra-ocular movements intact  Cardiovascular: regular rate and  regular rhythm. No murmur heard.  Pulmonary/Chest: Breath sounds normal. No wheezes or rales.   Musculoskeletal: Extremities exhibit normal range of motion without edema or tenderness.   Neurological: Awake and alert  Skin: Skin is warm and dry. No rash.         DIAGNOSTIC STUDIES:  LABS  Labs Reviewed   GROUP A STREP BY PCR   COV-2, FLU A/B, AND RSV BY PCR (CEPHEID)       All labs reviewed and independently interpreted by myself    RADIOLOGY  Final interpretation by radiology demonstrates:    DX-CHEST-PORTABLE (1 VIEW)   Final Result      No evidence of acute cardiopulmonary process.        The radiologist's interpretation of all radiological studies have been reviewed by me.        COURSE & MEDICAL DECISION MAKING    ED Observation Status? No    INITIAL ASSESSMENT AND PLAN    Patient is a 28-year-old female who comes in for evaluation of sore throat, cough and vomiting related to cough.  " Differential diagnosis includes strep pharyngitis, viral pharyngitis, viral syndrome, pneumonia.  Diagnostic work-up includes strep swab and chest x-ray.  Patient is managing secretions without difficulty, she has moist mucous membranes and is nontoxic-appearing, low suspicion for peritonsillar abscess or retropharyngeal abscess, therefore labs and CT imaging not indicated.       ER COURSE AND FINAL DISPOSITION   Patient's initial vitals notable for slight hypertension, otherwise unremarkable.  She is treated with ODT Zofran, oral naproxen and oral dexamethasone for management of symptoms.  Viral swabs returned and are negative for RSV/flu/COVID.  Chest x-ray is negative for pneumonia.  Strep swab is negative.  Upon reassessment patient is feeling improved and she is tolerating oral intake without difficulty.  She has not had emesis in the emergency department.  Therefore she is reassured and advised on management of viral syndrome and viral pharyngitis.  She is provided with prescriptions for naproxen and Zofran and given strict return precautions.  Patient is then discharged in stable condition.    ADDITIONAL PROBLEM LIST AND RESOURCE UTILIZATION    Additional problems aside from the chief complaint that I have addressed: none    I have discussed management of the patient with the following physicians and BASIL's: none    Discussion of management with other [facility] or appropriate source(s): none     Escalation of care considered, and ultimately not performed: see above.     Barriers to care at this time, including but not limited to: none.     Decision tools and prescription drugs considered including, but not limited to: see above.    Please see review of records as noted above      FINAL IMPRESSION  1. Viral syndrome    2. Nausea    3. Viral pharyngitis    4. Sore throat

## 2023-08-01 NOTE — ED TRIAGE NOTES
Pt comes in w/ mother  c/o sore throat, fever, cough, congestion,  loss of appetite and body aches the last 4 days   mother had same last week  feels she is not getting better  has taken OTC med's w/ little effect

## 2023-08-01 NOTE — ED NOTES
Assumed patient care for discharge only.    Discharge home with instructions, work note, rxn, and follow up care reviewed and given to patient with verbal understanding.    Family member with patient.

## 2023-08-01 NOTE — ED NOTES
Assessment complete. Medicated pt per ERP order. Swabs collected and sent. Call light within reach.

## 2024-01-21 ENCOUNTER — APPOINTMENT (OUTPATIENT)
Dept: RADIOLOGY | Facility: MEDICAL CENTER | Age: 29
End: 2024-01-21
Attending: EMERGENCY MEDICINE
Payer: MEDICAID

## 2024-01-21 ENCOUNTER — HOSPITAL ENCOUNTER (EMERGENCY)
Facility: MEDICAL CENTER | Age: 29
End: 2024-01-21
Attending: EMERGENCY MEDICINE
Payer: MEDICAID

## 2024-01-21 VITALS
BODY MASS INDEX: 44.53 KG/M2 | HEART RATE: 94 BPM | DIASTOLIC BLOOD PRESSURE: 98 MMHG | WEIGHT: 251.32 LBS | HEIGHT: 63 IN | TEMPERATURE: 98.6 F | SYSTOLIC BLOOD PRESSURE: 139 MMHG | RESPIRATION RATE: 18 BRPM | OXYGEN SATURATION: 96 %

## 2024-01-21 DIAGNOSIS — J06.9 UPPER RESPIRATORY TRACT INFECTION, UNSPECIFIED TYPE: ICD-10-CM

## 2024-01-21 PROCEDURE — 700111 HCHG RX REV CODE 636 W/ 250 OVERRIDE (IP): Performed by: EMERGENCY MEDICINE

## 2024-01-21 PROCEDURE — 99283 EMERGENCY DEPT VISIT LOW MDM: CPT

## 2024-01-21 PROCEDURE — 87651 STREP A DNA AMP PROBE: CPT

## 2024-01-21 PROCEDURE — 0241U HCHG SARS-COV-2 COVID-19 NFCT DS RESP RNA 4 TRGT MIC: CPT

## 2024-01-21 PROCEDURE — 71045 X-RAY EXAM CHEST 1 VIEW: CPT

## 2024-01-21 RX ORDER — ONDANSETRON 4 MG/1
4 TABLET, ORALLY DISINTEGRATING ORAL ONCE
Status: COMPLETED | OUTPATIENT
Start: 2024-01-21 | End: 2024-01-21

## 2024-01-21 RX ADMIN — ONDANSETRON 4 MG: 4 TABLET, ORALLY DISINTEGRATING ORAL at 09:12

## 2024-01-21 NOTE — ED NOTES
Covid and Strep cultures collected and sent to lab.  Medicated as ordered.  Pt and visitor updated on POC including pending tests and chart review by ERP.

## 2024-01-21 NOTE — ED PROVIDER NOTES
ED Provider Note    CHIEF COMPLAINT  Chief Complaint   Patient presents with    Sore Throat     X 3 d Some diff swallowing    Fever     T- max 102.5 F last night      Generalized Body Aches    Cough     Prod cough Clear to green sputum       EXTERNAL RECORDS REVIEWED  Inpatient Notes I reviewed discharge summary from March 15, 2021, at that time the patient was admitted for seizure    HPI/ROS  LIMITATION TO HISTORY   Select: : None  OUTSIDE HISTORIAN(S):  The patient's mother is here helping with history    Rosalba Leavitt is a 28 y.o. female who presents stating that for 3 days she has had sore throat worse with swallowing, congestion, generalized body aches and cough.  She feels nauseous currently.    PAST MEDICAL HISTORY   has a past medical history of Anxiety, Depression, and Seizure disorder (HCC).    SURGICAL HISTORY  patient denies any surgical history    FAMILY HISTORY  Family History   Adopted: Yes       SOCIAL HISTORY  Social History     Tobacco Use    Smoking status: Every Day     Current packs/day: 0.10     Types: Cigarettes    Smokeless tobacco: Never   Vaping Use    Vaping Use: Never used   Substance and Sexual Activity    Alcohol use: No    Drug use: Not Currently    Sexual activity: Never       CURRENT MEDICATIONS     Start End   acetaminophen (TYLENOL) 500 MG Tab  --   Sig: Take 500 mg by mouth one time.   Class: Historical Med   Route: Oral   Number of times this order has been changed since signin     Order Audit [place]     guaiFENesin ER (MUCINEX) 600 MG TABLET SR 12 HR  --   Sig: Take 600 mg by mouth 2 times a day as needed for Cough.   Class: Historical Med   Route: Oral   Number of times this order has been changed since signin     Order Audit [place]     mirtazapine (REMERON) 15 MG Tab  --   Sig: Take 15 mg by mouth every evening.   Class: Historical Med   Route: Oral   Number of times this order has been changed since signing: 3     Order Audit [place]     naproxen (NAPROSYN) 500 MG  "Tab 2023 --   Sig: Take 1 Tablet by mouth 2 times daily with meals as needed (sore throat).   Route: Oral   Renewals    Renewal requests to authorizing provider (Codi Jamison M.D.) prohibited      Number of times this order has been changed since signin     Order Audit [place]     ondansetron (ZOFRAN ODT) 4 MG TABLET DISPERSIBLE 2023 --   Sig: Take 1 Tablet by mouth every 6 hours as needed for Nausea/Vomiting.   Route: Oral   Renewals    Renewal requests to authorizing provider (Codi Jamison M.D.) prohibited      Number of times this order has been changed since signin     Order Audit [place]     gabapentin (NEURONTIN) 400 MG Cap  --   Sig: Take 400 mg by mouth 3 times a day.   Class: Historical Med   Route: Oral   Number of times this order has been changed since signin     Order Audit [place]     venlafaxine (EFFEXOR-XR) 150 MG extended-release capsule  --   Sig: Take 150 mg by mouth every day.   Class: Historical Med   Route: Oral   Number of times this order has been changed since signin     Order Audit [place]     prazosin (MINIPRESS) 1 MG Cap  --   Sig: Take 1 mg by mouth every evening.   Class: Historical Med   Route: Oral   Number of times this order has been changed since signing: 3     Order Audit [place]           ALLERGIES  No Known Allergies    PHYSICAL EXAM  VITAL SIGNS: BP (!) 164/101   Pulse 95   Temp 36.7 °C (98 °F) (Temporal)   Resp 16   Ht 1.6 m (5' 3\")   Wt 114 kg (251 lb 5.2 oz)   LMP 2024   SpO2 96%   BMI 44.52 kg/m²    Constitutional: Alert.  HENT: No signs of trauma, Bilateral external ears normal, Nose normal.   Throat: No erythema, no exudate, midline uvula.  Eyes: Pupils are equal and reactive, Conjunctiva normal, Non-icteric.   Neck: Normal range of motion, No tenderness, Supple, No stridor.   Lymphatic: No lymphadenopathy noted.   Cardiovascular: Regular rate and rhythm, no murmurs.   Thorax & Lungs: Normal breath sounds, No respiratory distress, No " wheezing, No chest tenderness.   Abdomen: Bowel sounds normal, Soft, No tenderness, No peritoneal signs, No masses, No pulsatile masses.   Skin: Warm, Dry, No erythema, No rash.   Back: No bony tenderness, No CVA tenderness.   Extremities: Intact distal pulses, No edema, No tenderness, No cyanosis.  Musculoskeletal: Good range of motion in all major joints. No tenderness to palpation or major deformities noted.   Neurologic: Alert , Normal motor function, Normal sensory function, No focal deficits noted.   Psychiatric: Affect normal, Judgment normal, Mood normal.       DIAGNOSTIC STUDIES / PROCEDURES      LABS  Labs Reviewed   COV-2, FLU A/B, AND RSV BY PCR (Manager[facility])   GROUP A STREP BY PCR         RADIOLOGY  I have independently interpreted the diagnostic imaging associated with this visit and am waiting the final reading from the radiologist.   My preliminary interpretation is as follows: Negative chest x-ray  Radiologist interpretation:     DX-CHEST-PORTABLE (1 VIEW)   Final Result      No acute process.            COURSE & MEDICAL DECISION MAKING    ED Observation Status? No; Patient does not meet criteria for ED Observation.     INITIAL ASSESSMENT, COURSE AND PLAN  Care Narrative:     The patient presents with fever, respiratory symptoms, nausea.  Sore throat.  Differential diagnosis includes strep pharyngitis, viral pharyngitis, RSV/COVID/flu, pneumonia.  Chest x-ray was ordered, PCR strep ordered.  PCR RSV/COVID/flu ordered.  The patient is given 4 mg p.o. Zofran for nausea.    ADDITIONAL PROBLEM LIST  Anxiety, depression, seizure disorder  DISPOSITION AND DISCUSSIONS  I have discussed management of the patient with the following physicians and BASIL's: None    Discussion of management with other [facility] [place] or appropriate source(s): None     Escalation of care considered, and ultimately not performed:acute inpatient care management, however at this time, the patient is most appropriate for outpatient  management    Barriers to care at this time, including but not limited to:  None .     Decision tools and prescription drugs considered including, but not limited to:  Outpatient Tylenol and Motrin .    The patient will return for new or worsening symptoms and is stable at the time of discharge.    The patient is referred to a primary physician for blood pressure management, diabetic screening, and for all other preventative health concerns.      DISPOSITION:  Patient will be discharged home in stable condition.    FOLLOW UP:  [facility], Emergency Dept  [address]  [place] [id number] [phone]    If symptoms worsen      Check MyChart for results of RSV/flu/COVID results          OUTPATIENT MEDICATIONS:  New Prescriptions    No medications on file         FINAL DIAGNOSIS  1. Upper respiratory tract infection, unspecified type           Electronically signed by: Demian Brown M.D., 1/21/2024 9:05 AM

## 2024-01-24 ENCOUNTER — OFFICE VISIT (OUTPATIENT)
Dept: URGENT CARE | Facility: CLINIC | Age: 29
End: 2024-01-24
Payer: MEDICAID

## 2024-01-24 VITALS
SYSTOLIC BLOOD PRESSURE: 126 MMHG | OXYGEN SATURATION: 95 % | WEIGHT: 245 LBS | TEMPERATURE: 97.8 F | RESPIRATION RATE: 16 BRPM | DIASTOLIC BLOOD PRESSURE: 68 MMHG | HEIGHT: 63 IN | BODY MASS INDEX: 43.41 KG/M2 | HEART RATE: 87 BPM

## 2024-01-24 DIAGNOSIS — H66.003 NON-RECURRENT ACUTE SUPPURATIVE OTITIS MEDIA OF BOTH EARS WITHOUT SPONTANEOUS RUPTURE OF TYMPANIC MEMBRANES: ICD-10-CM

## 2024-01-24 DIAGNOSIS — J32.9 RHINOSINUSITIS: ICD-10-CM

## 2024-01-24 DIAGNOSIS — H10.9 BACTERIAL CONJUNCTIVITIS OF RIGHT EYE: ICD-10-CM

## 2024-01-24 PROCEDURE — 3074F SYST BP LT 130 MM HG: CPT | Performed by: PHYSICIAN ASSISTANT

## 2024-01-24 PROCEDURE — 3078F DIAST BP <80 MM HG: CPT | Performed by: PHYSICIAN ASSISTANT

## 2024-01-24 PROCEDURE — 99203 OFFICE O/P NEW LOW 30 MIN: CPT | Performed by: PHYSICIAN ASSISTANT

## 2024-01-24 RX ORDER — AMOXICILLIN AND CLAVULANATE POTASSIUM 875; 125 MG/1; MG/1
1 TABLET, FILM COATED ORAL 2 TIMES DAILY
Qty: 14 TABLET | Refills: 0 | Status: SHIPPED | OUTPATIENT
Start: 2024-01-24 | End: 2024-01-31

## 2024-01-24 RX ORDER — OFLOXACIN 3 MG/ML
1 SOLUTION/ DROPS OPHTHALMIC 4 TIMES DAILY
Qty: 10 ML | Refills: 0 | Status: SHIPPED | OUTPATIENT
Start: 2024-01-24

## 2024-01-24 NOTE — PROGRESS NOTES
"Subjective:   Rosalba Leavitt is a 28 y.o. female who presents for URI (Patient states was dx with a URI a few days ago, still is having coughing, wheezing still) and Eye Problem (Woke up this morning with crusted shut rt eye, redness, swelling)     URI sxs x one week with pain and pressure to b/l ears. Nasal congestion and green nasal discharge.  Fever to 100.9.  Awoke with eye redness and drainage, matting, discharge.  Does not wear contacts.  Has been using mucinex.  Cough, Mild Sob.  Significant sinus pressure and purulent nasal discharge.  Right greater than left ear pain and pressure..        Medications:  acetaminophen Tabs  gabapentin Caps  guaiFENesin ER Tb12  mirtazapine Tabs  naproxen Tabs  ondansetron Tbdp  prazosin Caps  venlafaxine    Allergies:             Patient has no known allergies.    Surgical History:       No past surgical history on file.    Past Social Hx:  Rosalba Leavitt  reports that she has been smoking cigarettes. She has never used smokeless tobacco. She reports that she does not currently use drugs. She reports that she does not drink alcohol.     Past Family Hx:   Rosalba Leavitt family history is not on file. She was adopted.       Problem list, medications, and allergies reviewed by myself today in Epic.     Objective:     /68 (BP Location: Left arm, Patient Position: Sitting, BP Cuff Size: Large adult)   Pulse 87   Temp 36.6 °C (97.8 °F)   Resp 16   Ht 1.6 m (5' 3\")   Wt 111 kg (245 lb)   LMP 01/20/2024   SpO2 95%   BMI 43.40 kg/m²     Physical Exam  Vitals and nursing note reviewed.   Constitutional:       General: She is not in acute distress.     Appearance: Normal appearance. She is well-developed. She is not ill-appearing or toxic-appearing.   HENT:      Head: Normocephalic.      Right Ear: Hearing and external ear normal. No decreased hearing noted. No drainage, swelling or tenderness. A middle ear effusion is present. No foreign body. No mastoid " tenderness. Tympanic membrane is injected and erythematous. Tympanic membrane is not perforated or bulging. Tympanic membrane has decreased mobility.      Left Ear: Hearing and external ear normal. No decreased hearing noted. No drainage, swelling or tenderness. A middle ear effusion is present. No foreign body. No mastoid tenderness. Tympanic membrane is injected and erythematous. Tympanic membrane is not perforated or bulging. Tympanic membrane has normal mobility.      Ears:      Comments: Right TM erythematous and bulging, mild erythema to left TM     Nose: Mucosal edema, congestion and rhinorrhea present.      Right Nostril: No foreign body.      Left Nostril: No foreign body.      Right Turbinates: Swollen.      Left Turbinates: Swollen.      Right Sinus: Maxillary sinus tenderness and frontal sinus tenderness present.      Left Sinus: Maxillary sinus tenderness and frontal sinus tenderness present.      Mouth/Throat:      Mouth: Mucous membranes are moist.      Pharynx: Oropharynx is clear. Uvula midline. Posterior oropharyngeal erythema present. No pharyngeal swelling, oropharyngeal exudate or uvula swelling.      Tonsils: No tonsillar exudate or tonsillar abscesses.      Comments: Mild pharyngeal edema.  No tonsillar exudate.  Eyes:      General:         Right eye: No discharge.         Left eye: No discharge.      Extraocular Movements: Extraocular movements intact.      Pupils: Pupils are equal, round, and reactive to light.   Cardiovascular:      Rate and Rhythm: Normal rate and regular rhythm.      Pulses: Normal pulses.      Heart sounds: Normal heart sounds. No murmur heard.  Pulmonary:      Effort: Pulmonary effort is normal. No tachypnea or respiratory distress.      Breath sounds: Normal breath sounds and air entry. No stridor or decreased air movement. No decreased breath sounds, wheezing, rhonchi or rales.      Comments: Lungs are clear to auscultation bilaterally, no rhonchi rales or  wheezes  Chest:      Chest wall: No tenderness.   Musculoskeletal:      Cervical back: Normal range of motion and neck supple. No rigidity.   Lymphadenopathy:      Cervical: No cervical adenopathy.   Skin:     General: Skin is warm.   Neurological:      Mental Status: She is alert.   Psychiatric:         Behavior: Behavior is cooperative.         Assessment/Plan:     Diagnosis and Associated Orders:     1. Rhinosinusitis  - amoxicillin-clavulanate (AUGMENTIN) 875-125 MG Tab; Take 1 Tablet by mouth 2 times a day for 7 days.  Dispense: 14 Tablet; Refill: 0    2. Non-recurrent acute suppurative otitis media of both ears without spontaneous rupture of tympanic membranes  - amoxicillin-clavulanate (AUGMENTIN) 875-125 MG Tab; Take 1 Tablet by mouth 2 times a day for 7 days.  Dispense: 14 Tablet; Refill: 0    3. Bacterial conjunctivitis of right eye  - ofloxacin (OCUFLOX) 0.3 % Solution; Administer 1 Drop into the right eye 4 times a day.  Dispense: 10 mL; Refill: 0        Comments/MDM:  Patient presents with 1 week history of URI symptoms, now with evidence of bilateral otitis media right greater than left, rhinosinusitis and bacterial conjunctivitis.  Oral antibiotic drops administered.    Advised patient/parent to soak a wash cloth in warm (not scalding) water and place it over eyes in the morning. As the wash cloth cools, it should be rewarmed and replaced for a total of 5 to 10 minutes of soaking time. Wash eyelashes with gentle baby shampoo. Antibiotics as prescribed. Good hand and ocular hygiene.  No  until on antibiotic ointment for 24 hours.  Patient/parent verbalized understanding of treatment plan and has no further questions regarding care.     -Increase water intake  -May use over the counter Ibuprofen/Tylenol as needed for any fever, body aches or throat pain  -May take long acting antihistamine for seasonal allergy symptoms and post-nasal drip as needed  -Over the counter cough suppressant as  directed.  -May use over the counter saline nasal spray for nasal lavage for nasal congestion as needed  -May use over the counter Nasacort/Flonase for nasal congestion as needed   -May use throat lozenges for throat discomfort as needed   -May gargle with salt water up to 4x/day as needed for throat discomfort (1 tsp salt dissolved in 1 cup warm water)  -Monitor for increased sinus pain/pressure with sinus congestion with thick mucus production, sinus headache, cough, shortness of breath, fever- need re-evaluation      I personally reviewed prior external notes and test results pertinent to today's visit. Supportive care, natural history, differential diagnoses, and indications for immediate follow-up discussed. Return to clinic or go to ED if symptoms worsen or persist.  Red flag symptoms discussed.  Patient/Parent/Guardian voices understanding. Follow-up with your primary care provider in 3-5 days.  All side effects of medication discussed including allergic response, GI upset, tendon injury, rash, sedation etc    Please note that this dictation was created using voice recognition software. I have made a reasonable attempt to correct obvious errors, but I expect that there are errors of grammar and possibly content that I did not discover before finalizing the note.    This note was electronically signed by Radha Buitrago PA-C

## 2024-01-24 NOTE — LETTER
January 24, 2024    To Whom It May Concern:         This is confirmation that Rosalba Leavitt attended her scheduled appointment with Radha Buitrago P.A.-C. on 1/24/24.  Please excuse patient from work today and tomorrow.         If you have any questions please do not hesitate to call me at the phone number listed below.    Sincerely,          Radha Buitrago P.A.-C.  [phone]

## 2024-11-27 ENCOUNTER — OFFICE VISIT (OUTPATIENT)
Dept: URGENT CARE | Facility: PHYSICIAN GROUP | Age: 29
End: 2024-11-27

## 2024-11-27 VITALS
BODY MASS INDEX: 48.07 KG/M2 | HEART RATE: 102 BPM | RESPIRATION RATE: 16 BRPM | WEIGHT: 271.28 LBS | SYSTOLIC BLOOD PRESSURE: 126 MMHG | HEIGHT: 63 IN | TEMPERATURE: 97.2 F | DIASTOLIC BLOOD PRESSURE: 88 MMHG | OXYGEN SATURATION: 96 %

## 2024-11-27 DIAGNOSIS — F32.A DEPRESSION, UNSPECIFIED DEPRESSION TYPE: ICD-10-CM

## 2024-11-27 DIAGNOSIS — F41.1 GAD (GENERALIZED ANXIETY DISORDER): ICD-10-CM

## 2024-11-27 PROCEDURE — 99213 OFFICE O/P EST LOW 20 MIN: CPT | Performed by: REGISTERED NURSE

## 2024-11-27 PROCEDURE — 3079F DIAST BP 80-89 MM HG: CPT | Performed by: REGISTERED NURSE

## 2024-11-27 PROCEDURE — 3074F SYST BP LT 130 MM HG: CPT | Performed by: REGISTERED NURSE

## 2024-11-27 RX ORDER — GABAPENTIN 400 MG/1
400 CAPSULE ORAL 3 TIMES DAILY
Qty: 270 CAPSULE | Refills: 0 | Status: SHIPPED | OUTPATIENT
Start: 2024-11-27 | End: 2025-02-25

## 2024-11-27 RX ORDER — MIRTAZAPINE 15 MG/1
15 TABLET, FILM COATED ORAL EVERY EVENING
Qty: 90 TABLET | Refills: 0 | Status: SHIPPED | OUTPATIENT
Start: 2024-11-27 | End: 2025-02-25

## 2024-11-27 RX ORDER — VENLAFAXINE HYDROCHLORIDE 150 MG/1
150 CAPSULE, EXTENDED RELEASE ORAL DAILY
Qty: 90 CAPSULE | Refills: 0 | Status: SHIPPED | OUTPATIENT
Start: 2024-11-27 | End: 2025-02-25

## 2024-11-27 NOTE — PROGRESS NOTES
"Subjective:   Rosalba Leavitt is a 29 y.o. female who presents for Medication Refill (Venlafaxine  mg, one po qd.  Mirtazapine 15 mg, one po qd.  Gabapentin 400 mg one po TID. )      HPI  Primary moved needs refills of venlafaxine  mg daily, mirtazapine 15 mg nightly, gabapentin 400 mg 3 times daily.  Still on all medications.  Hoping to establish at [facility].    ROS no fevers chills vomiting shortness of breath chest pain, SI or HI    Medications, Allergies, and current problem list reviewed today in Epic.     Objective:     /88   Pulse (!) 102   Temp 36.2 °C (97.2 °F) (Temporal)   Resp 16   Ht 1.6 m (5' 3\")   Wt 123 kg (271 lb 4.4 oz)   SpO2 96%     Physical Exam  Vitals and nursing note reviewed.   Constitutional:       Appearance: She is not ill-appearing or toxic-appearing.   HENT:      Head: Normocephalic.   Eyes:      Pupils: Pupils are equal, round, and reactive to light.   Cardiovascular:      Rate and Rhythm: Normal rate.   Pulmonary:      Effort: Pulmonary effort is normal.   Neurological:      General: No focal deficit present.      Mental Status: She is alert.   Psychiatric:         Mood and Affect: Mood normal.         Behavior: Behavior normal.         Thought Content: Thought content normal. Thought content does not include homicidal or suicidal ideation.         Judgment: Judgment normal.         Assessment/Plan:     I personally reviewed prior external notes and test results pertinent to today's visit as well as additional imaging and testing completed in clinic today.    I introduced myself as Marcos Lerner a Nurse Practitioner.    1. Depression, unspecified depression type  venlafaxine (EFFEXOR-XR) 150 MG extended-release capsule      2. DARNELL (generalized anxiety disorder)  mirtazapine (REMERON) 15 MG Tab    gabapentin (NEURONTIN) 400 MG Cap        Patient has bottles with her and still actively taking them, will refill for 90 days while trying to get reestablished at " [facility].  No SI or HI.. Medication discussed included indication for use and the potential benefits and side effects. The Patient was encouraged to monitor symptoms closely, and we reviewed the signs and symptoms that require a higher level of care through the emergency department. Patient verbalized understanding.    Please note that this dictation was created using voice recognition software. I have made every reasonable attempt to correct obvious errors, but I expect that there are errors of grammar and possibly content that I did not discover before finalizing the note.    This note was electronically signed by JOCELYNN Harkins

## 2024-12-23 ENCOUNTER — OFFICE VISIT (OUTPATIENT)
Dept: URGENT CARE | Facility: PHYSICIAN GROUP | Age: 29
End: 2024-12-23

## 2024-12-23 VITALS
OXYGEN SATURATION: 97 % | HEART RATE: 87 BPM | RESPIRATION RATE: 18 BRPM | DIASTOLIC BLOOD PRESSURE: 80 MMHG | HEIGHT: 63 IN | BODY MASS INDEX: 48.27 KG/M2 | WEIGHT: 272.44 LBS | TEMPERATURE: 97.7 F | SYSTOLIC BLOOD PRESSURE: 124 MMHG

## 2024-12-23 DIAGNOSIS — F43.10 PTSD (POST-TRAUMATIC STRESS DISORDER): ICD-10-CM

## 2024-12-23 PROCEDURE — 3074F SYST BP LT 130 MM HG: CPT | Performed by: FAMILY MEDICINE

## 2024-12-23 PROCEDURE — 3079F DIAST BP 80-89 MM HG: CPT | Performed by: FAMILY MEDICINE

## 2024-12-23 PROCEDURE — 99214 OFFICE O/P EST MOD 30 MIN: CPT | Performed by: FAMILY MEDICINE

## 2024-12-23 RX ORDER — PRAZOSIN HYDROCHLORIDE 1 MG/1
1 CAPSULE ORAL NIGHTLY
Qty: 90 CAPSULE | Refills: 0 | Status: SHIPPED | OUTPATIENT
Start: 2024-12-23

## 2024-12-23 NOTE — PROGRESS NOTES
"   Chief Complaint   Patient presents with    Depression               HPI:      Anxiety/PTSD      Here for med refill only.     States she takes prazosin for PTSD, night terrors.     Ran out recently and having more nightmares.      Psychiatrist has moved out of the area.     Denies depression, SI              Past Medical History:   Diagnosis Date    Anxiety     Depression     Seizure disorder (HCC)          Social History     Tobacco Use    Smoking status: Every Day     Current packs/day: 0.10     Types: Cigarettes    Smokeless tobacco: Never   Vaping Use    Vaping status: Never Used   Substance Use Topics    Alcohol use: No    Drug use: Not Currently           Family History   Adopted: Yes                 Review of Systems   Constitutional: Negative for fever, chills .   + malaise/fatigue.   Eyes: Negative for vision changes, d/c.    Respiratory: Negative for cough and sputum production.    Cardiovascular: Negative for chest pain and palpitations.   Gastrointestinal: Negative for nausea, vomiting, abdominal pain, diarrhea and constipation.   Genitourinary: Negative for dysuria, urgency and frequency.   Skin: Negative for rash or  itching.   Neurological: Negative for dizziness and tingling.   Psychiatric/Behavioral:   denies suicidal ideation  Hematologic/lymphatic - denies bruising or excessive bleeding  All other systems reviewed and are negative.       OBJECTIVE  /80 (BP Location: Left arm, Patient Position: Sitting, BP Cuff Size: Adult long)   Pulse 87   Temp 36.5 °C (97.7 °F) (Temporal)   Resp 18   Ht 1.6 m (5' 3\")   Wt 124 kg (272 lb 7.1 oz)   SpO2 97%     General:  No acute distress.    HEENT - PERRLA, EOMI  Neuro - alert and oriented x3. CN 2-12 grossly intact.  Lungs - CTA. No wheezes, rhonchi or rales.  Heart - regular rate and rhythm without murmur.  Psych -   Mood - \"ok\".   Affect:   euthymic.    Speech is regular rate, rhythm.   Thought process is linear and goal directed.   "         A/P:     1. PTSD (post-traumatic stress disorder)   Prazosin refilled      - prazosin (MINIPRESS) 1 MG Cap; Take 1 Capsule by mouth every evening.  Dispense: 90 Capsule; Refill: 0       Referred to psych

## 2025-01-21 ENCOUNTER — OFFICE VISIT (OUTPATIENT)
Dept: URGENT CARE | Facility: PHYSICIAN GROUP | Age: 30
End: 2025-01-21
Payer: MEDICAID

## 2025-01-21 VITALS
OXYGEN SATURATION: 96 % | DIASTOLIC BLOOD PRESSURE: 80 MMHG | WEIGHT: 276.57 LBS | HEIGHT: 63 IN | TEMPERATURE: 97.7 F | RESPIRATION RATE: 12 BRPM | SYSTOLIC BLOOD PRESSURE: 134 MMHG | HEART RATE: 92 BPM | BODY MASS INDEX: 49 KG/M2

## 2025-01-21 DIAGNOSIS — J01.00 ACUTE NON-RECURRENT MAXILLARY SINUSITIS: ICD-10-CM

## 2025-01-21 PROCEDURE — 3079F DIAST BP 80-89 MM HG: CPT | Performed by: PHYSICIAN ASSISTANT

## 2025-01-21 PROCEDURE — 3075F SYST BP GE 130 - 139MM HG: CPT | Performed by: PHYSICIAN ASSISTANT

## 2025-01-21 PROCEDURE — 99213 OFFICE O/P EST LOW 20 MIN: CPT | Performed by: PHYSICIAN ASSISTANT

## 2025-01-21 NOTE — PROGRESS NOTES
"Subjective:   Rosalba Leavitt is a 29 y.o. female who presents for Other (x4 days headache/pressure, bright green nasal discharge, and right ear pain.)      HPI  The patient presents to the Urgent Care with complaints of sinus symptoms and a cough onset 5 days ago.  Reports of sinus pressure, headache, right ear fullness, nasal congestion, and a mild intermittent cough.  Right green nasal discharge.  There is some concern for possible nasal infection. Denies any fever, chills, body aches, chest pain, SOB, vomiting, diarrhea. Tolerating fluid well. No known sick contacts. Vapes.       Past Medical History:   Diagnosis Date    Anxiety     Depression     Seizure disorder (HCC)      No Known Allergies     Objective:     /80   Pulse 92   Temp 36.5 °C (97.7 °F) (Temporal)   Resp 12   Ht 1.6 m (5' 3\")   Wt (!) 125 kg (276 lb 9.1 oz)   SpO2 96%   BMI 48.99 kg/m²     Physical Exam  Vitals reviewed.   Constitutional:       General: She is not in acute distress.     Appearance: Normal appearance. She is not ill-appearing or toxic-appearing.   HENT:      Right Ear: Tympanic membrane normal.      Left Ear: Tympanic membrane normal.      Nose: Mucosal edema and rhinorrhea present. Rhinorrhea is clear.      Right Sinus: No maxillary sinus tenderness or frontal sinus tenderness.      Left Sinus: No maxillary sinus tenderness or frontal sinus tenderness.      Mouth/Throat:      Mouth: Mucous membranes are moist.      Pharynx: Oropharynx is clear. No oropharyngeal exudate or posterior oropharyngeal erythema.   Eyes:      Conjunctiva/sclera: Conjunctivae normal.   Cardiovascular:      Rate and Rhythm: Normal rate and regular rhythm.      Heart sounds: Normal heart sounds.   Pulmonary:      Effort: Pulmonary effort is normal. No respiratory distress.      Breath sounds: Normal breath sounds. No wheezing, rhonchi or rales.   Musculoskeletal:      Cervical back: Neck supple. No rigidity.   Skin:     General: Skin is warm " and dry.   Neurological:      General: No focal deficit present.      Mental Status: She is alert and oriented to person, place, and time.   Psychiatric:         Mood and Affect: Mood normal.         Behavior: Behavior normal.         Diagnosis and associated orders:     1. Acute non-recurrent maxillary sinusitis  - amoxicillin-clavulanate (AUGMENTIN) 875-125 MG Tab; Take 1 Tablet by mouth 2 times a day for 7 days.  Dispense: 14 Tablet; Refill: 0       Comments/MDM:     Patient's presenting symptoms and exam findings are consistent with sinusitis most likely viral etiology. Therefore, antibiotics are not indicated at this time.   Contingent antibiotic prescription given to patient to fill upon meeting criteria of strict guidelines discussed in length.   I recommend plenty of fluids, Flonase nasal spray, nasal saline washes or carlton pot, Mucinex decongestant, Ibuprofen or Tylenol for pain, non-drowsy antihistamine such as Zyrtec or Claritin.        I personally reviewed prior external notes and test results pertinent to today's visit. Pathogenesis of diagnosis discussed including typical length and natural progression. Supportive care, natural history, differential diagnoses, and indications for immediate follow-up discussed. Patient expresses understanding and agrees to plan. Patient denies any other questions or concerns.     Follow-up with the primary care physician for recheck, reevaluation, and consideration of further management.    Please note that this dictation was created using voice recognition software. I have made a reasonable attempt to correct obvious errors, but I expect that there are errors of grammar and possibly content that I did not discover before finalizing the note.    This note was electronically signed by Rachid Contreras PA-C

## 2025-05-31 ENCOUNTER — APPOINTMENT (OUTPATIENT)
Dept: URGENT CARE | Facility: CLINIC | Age: 30
End: 2025-05-31
Payer: MEDICAID